# Patient Record
Sex: FEMALE | Race: WHITE | Employment: OTHER | URBAN - METROPOLITAN AREA
[De-identification: names, ages, dates, MRNs, and addresses within clinical notes are randomized per-mention and may not be internally consistent; named-entity substitution may affect disease eponyms.]

---

## 2018-06-07 ENCOUNTER — HOSPITAL ENCOUNTER (OUTPATIENT)
Dept: MAMMOGRAPHY | Age: 73
Discharge: HOME | End: 2018-06-09
Payer: MEDICARE

## 2018-06-07 DIAGNOSIS — Z13.820 SCREENING FOR OSTEOPOROSIS: ICD-10-CM

## 2018-06-07 PROCEDURE — 77080 DXA BONE DENSITY AXIAL: CPT

## 2020-02-19 LAB
AVERAGE GLUCOSE: NORMAL
HBA1C MFR BLD: 5.7 %

## 2020-08-12 LAB
ALBUMIN SERPL-MCNC: NORMAL G/DL
ALP BLD-CCNC: NORMAL U/L
ALT SERPL-CCNC: NORMAL U/L
ANION GAP SERPL CALCULATED.3IONS-SCNC: NORMAL MMOL/L
AST SERPL-CCNC: NORMAL U/L
BASOPHILS ABSOLUTE: NORMAL
BASOPHILS RELATIVE PERCENT: NORMAL
BILIRUB SERPL-MCNC: NORMAL MG/DL
BUN BLDV-MCNC: NORMAL MG/DL
CALCIUM SERPL-MCNC: NORMAL MG/DL
CHLORIDE BLD-SCNC: NORMAL MMOL/L
CHOLESTEROL, TOTAL: NORMAL
CHOLESTEROL/HDL RATIO: NORMAL
CO2: NORMAL
CREAT SERPL-MCNC: NORMAL MG/DL
EOSINOPHILS ABSOLUTE: NORMAL
EOSINOPHILS RELATIVE PERCENT: NORMAL
GFR CALCULATED: NORMAL
GLUCOSE BLD-MCNC: NORMAL MG/DL
HCT VFR BLD CALC: NORMAL %
HDLC SERPL-MCNC: NORMAL MG/DL
HEMOGLOBIN: NORMAL
LDL CHOLESTEROL CALCULATED: NORMAL
LYMPHOCYTES ABSOLUTE: NORMAL
LYMPHOCYTES RELATIVE PERCENT: NORMAL
MCH RBC QN AUTO: NORMAL PG
MCHC RBC AUTO-ENTMCNC: NORMAL G/DL
MCV RBC AUTO: NORMAL FL
MONOCYTES ABSOLUTE: NORMAL
MONOCYTES RELATIVE PERCENT: NORMAL
NEUTROPHILS ABSOLUTE: NORMAL
NEUTROPHILS RELATIVE PERCENT: NORMAL
NONHDLC SERPL-MCNC: NORMAL MG/DL
PDW BLD-RTO: NORMAL %
PLATELET # BLD: NORMAL 10*3/UL
PMV BLD AUTO: NORMAL FL
POTASSIUM SERPL-SCNC: NORMAL MMOL/L
RBC # BLD: NORMAL 10*6/UL
SODIUM BLD-SCNC: NORMAL MMOL/L
TOTAL PROTEIN: NORMAL
TRIGL SERPL-MCNC: NORMAL MG/DL
TSH SERPL DL<=0.05 MIU/L-ACNC: 1.76 UIU/ML
VITAMIN D 25-HYDROXY: NORMAL
VITAMIN D2, 25 HYDROXY: NORMAL
VITAMIN D3,25 HYDROXY: NORMAL
VLDLC SERPL CALC-MCNC: NORMAL MG/DL
WBC # BLD: NORMAL 10*3/UL

## 2020-08-25 LAB
AVERAGE GLUCOSE: NORMAL
HBA1C MFR BLD: 5.7 %

## 2020-11-25 LAB
AVERAGE GLUCOSE: NORMAL
HBA1C MFR BLD: 5.8 %

## 2021-02-26 LAB
AVERAGE GLUCOSE: NORMAL
HBA1C MFR BLD: 5.8 %

## 2021-06-03 LAB
AVERAGE GLUCOSE: NORMAL
HBA1C MFR BLD: 5.8 %

## 2021-09-02 LAB
ALBUMIN SERPL-MCNC: NORMAL G/DL
ALP BLD-CCNC: NORMAL U/L
ALT SERPL-CCNC: NORMAL U/L
ANION GAP SERPL CALCULATED.3IONS-SCNC: NORMAL MMOL/L
AST SERPL-CCNC: NORMAL U/L
BASOPHILS ABSOLUTE: NORMAL
BASOPHILS RELATIVE PERCENT: NORMAL
BILIRUB SERPL-MCNC: NORMAL MG/DL
BUN BLDV-MCNC: NORMAL MG/DL
CALCIUM SERPL-MCNC: NORMAL MG/DL
CHLORIDE BLD-SCNC: NORMAL MMOL/L
CHOLESTEROL, TOTAL: NORMAL
CHOLESTEROL/HDL RATIO: NORMAL
CO2: NORMAL
CREAT SERPL-MCNC: NORMAL MG/DL
EOSINOPHILS ABSOLUTE: NORMAL
EOSINOPHILS RELATIVE PERCENT: NORMAL
GFR CALCULATED: NORMAL
GLUCOSE BLD-MCNC: NORMAL MG/DL
HCT VFR BLD CALC: NORMAL %
HDLC SERPL-MCNC: NORMAL MG/DL
HEMOGLOBIN: NORMAL
LDL CHOLESTEROL CALCULATED: NORMAL
LYMPHOCYTES ABSOLUTE: NORMAL
LYMPHOCYTES RELATIVE PERCENT: NORMAL
MCH RBC QN AUTO: NORMAL PG
MCHC RBC AUTO-ENTMCNC: NORMAL G/DL
MCV RBC AUTO: NORMAL FL
MONOCYTES ABSOLUTE: NORMAL
MONOCYTES RELATIVE PERCENT: NORMAL
NEUTROPHILS ABSOLUTE: NORMAL
NEUTROPHILS RELATIVE PERCENT: NORMAL
NONHDLC SERPL-MCNC: NORMAL MG/DL
PDW BLD-RTO: NORMAL %
PLATELET # BLD: NORMAL 10*3/UL
PMV BLD AUTO: NORMAL FL
POTASSIUM SERPL-SCNC: NORMAL MMOL/L
RBC # BLD: NORMAL 10*6/UL
SODIUM BLD-SCNC: NORMAL MMOL/L
TOTAL PROTEIN: NORMAL
TRIGL SERPL-MCNC: NORMAL MG/DL
VITAMIN D 25-HYDROXY: NORMAL
VITAMIN D2, 25 HYDROXY: NORMAL
VITAMIN D3,25 HYDROXY: NORMAL
VLDLC SERPL CALC-MCNC: NORMAL MG/DL
WBC # BLD: NORMAL 10*3/UL

## 2021-09-07 LAB
AVERAGE GLUCOSE: NORMAL
HBA1C MFR BLD: 5.7 %

## 2021-12-08 LAB
AVERAGE GLUCOSE: NORMAL
HBA1C MFR BLD: 5.8 %

## 2022-05-20 RX ORDER — CALCIUM CITRATE/VITAMIN D3 200MG-6.25
TABLET ORAL
Qty: 50 STRIP | Refills: 10 | Status: SHIPPED | OUTPATIENT
Start: 2022-05-20

## 2022-07-08 RX ORDER — GLUCOSAM/CHON-MSM1/C/MANG/BOSW 500-416.6
TABLET ORAL
Refills: 10 | OUTPATIENT
Start: 2022-07-08

## 2022-07-14 VITALS
DIASTOLIC BLOOD PRESSURE: 60 MMHG | OXYGEN SATURATION: 97 % | BODY MASS INDEX: 36.16 KG/M2 | WEIGHT: 225 LBS | HEIGHT: 66 IN | SYSTOLIC BLOOD PRESSURE: 102 MMHG | TEMPERATURE: 97.8 F | HEART RATE: 80 BPM

## 2022-07-14 RX ORDER — LISINOPRIL AND HYDROCHLOROTHIAZIDE 12.5; 1 MG/1; MG/1
TABLET ORAL
COMMUNITY
Start: 2022-06-17

## 2022-07-14 RX ORDER — OXYBUTYNIN CHLORIDE 5 MG/1
TABLET ORAL
COMMUNITY
Start: 2022-06-17

## 2022-07-14 RX ORDER — ICOSAPENT ETHYL 1000 MG/1
CAPSULE ORAL
COMMUNITY
Start: 2022-07-07

## 2022-07-14 RX ORDER — VERAPAMIL HYDROCHLORIDE 120 MG/1
TABLET, FILM COATED ORAL
COMMUNITY
Start: 2022-06-17

## 2022-07-14 RX ORDER — LOVASTATIN 40 MG/1
TABLET ORAL
COMMUNITY
Start: 2022-06-17

## 2022-07-14 RX ORDER — GLUCOSAM/CHON-MSM1/C/MANG/BOSW 500-416.6
TABLET ORAL
COMMUNITY
Start: 2022-04-06

## 2022-07-14 RX ORDER — LEVOTHYROXINE SODIUM 0.1 MG/1
TABLET ORAL
COMMUNITY
Start: 2022-06-17

## 2022-07-19 ENCOUNTER — HOSPITAL ENCOUNTER (OUTPATIENT)
Dept: GENERAL RADIOLOGY | Age: 77
Discharge: HOME | End: 2022-07-21
Payer: MEDICARE

## 2022-07-19 ENCOUNTER — HOSPITAL ENCOUNTER (OUTPATIENT)
Age: 77
Discharge: HOME | End: 2022-07-21
Payer: MEDICARE

## 2022-07-19 DIAGNOSIS — M79.671 FOOT PAIN, RIGHT: ICD-10-CM

## 2022-07-19 PROCEDURE — 73630 X-RAY EXAM OF FOOT: CPT

## 2023-02-13 PROBLEM — E03.9 HYPOTHYROIDISM: Status: ACTIVE | Noted: 2023-02-13

## 2023-02-13 PROBLEM — F41.9 ANXIETY: Status: ACTIVE | Noted: 2023-02-13

## 2023-02-13 PROBLEM — M77.31 CALCANEAL SPUR OF RIGHT FOOT: Status: ACTIVE | Noted: 2023-02-13

## 2023-02-13 PROBLEM — N18.30 CKD (CHRONIC KIDNEY DISEASE) STAGE 3, GFR 30-59 ML/MIN (MULTI): Status: ACTIVE | Noted: 2023-02-13

## 2023-02-13 PROBLEM — M19.90 OSTEOARTHRITIS: Status: ACTIVE | Noted: 2023-02-13

## 2023-02-13 PROBLEM — M19.079 OA (OSTEOARTHRITIS) OF FOOT: Status: ACTIVE | Noted: 2023-02-13

## 2023-02-13 PROBLEM — E11.65 TYPE 2 DIABETES MELLITUS WITH HYPERGLYCEMIA, WITHOUT LONG-TERM CURRENT USE OF INSULIN (MULTI): Status: ACTIVE | Noted: 2023-02-13

## 2023-02-13 PROBLEM — E78.1 HYPERTRIGLYCERIDEMIA: Status: ACTIVE | Noted: 2023-02-13

## 2023-02-13 PROBLEM — D75.1 ERYTHROCYTOSIS: Status: ACTIVE | Noted: 2023-02-13

## 2023-02-13 PROBLEM — J30.9 ALLERGIC RHINITIS: Status: ACTIVE | Noted: 2023-02-13

## 2023-02-13 PROBLEM — K63.5 COLON POLYP: Status: ACTIVE | Noted: 2023-02-13

## 2023-02-13 PROBLEM — N32.81 OVERACTIVE BLADDER: Status: ACTIVE | Noted: 2023-02-13

## 2023-02-13 PROBLEM — E78.5 HYPERLIPIDEMIA: Status: ACTIVE | Noted: 2023-02-13

## 2023-02-13 PROBLEM — E55.9 VITAMIN D DEFICIENCY: Status: ACTIVE | Noted: 2023-02-13

## 2023-02-13 PROBLEM — I10 HYPERTENSION: Status: ACTIVE | Noted: 2023-02-13

## 2023-03-08 ENCOUNTER — TELEPHONE (OUTPATIENT)
Dept: PRIMARY CARE | Facility: CLINIC | Age: 78
End: 2023-03-08
Payer: MEDICARE

## 2023-03-08 DIAGNOSIS — I10 PRIMARY HYPERTENSION: ICD-10-CM

## 2023-03-08 DIAGNOSIS — E78.1 HYPERTRIGLYCERIDEMIA: ICD-10-CM

## 2023-03-08 DIAGNOSIS — N32.81 OVERACTIVE BLADDER: Primary | ICD-10-CM

## 2023-03-08 DIAGNOSIS — E03.9 ACQUIRED HYPOTHYROIDISM: ICD-10-CM

## 2023-03-08 DIAGNOSIS — E78.2 MIXED HYPERLIPIDEMIA: ICD-10-CM

## 2023-03-08 RX ORDER — LISINOPRIL AND HYDROCHLOROTHIAZIDE 10; 12.5 MG/1; MG/1
1 TABLET ORAL DAILY
Qty: 30 TABLET | Refills: 1 | Status: SHIPPED | OUTPATIENT
Start: 2023-03-08 | End: 2023-04-06

## 2023-03-08 RX ORDER — OXYBUTYNIN CHLORIDE 5 MG/1
5 TABLET ORAL DAILY
Qty: 30 TABLET | Refills: 1 | Status: SHIPPED | OUTPATIENT
Start: 2023-03-08 | End: 2023-04-06

## 2023-03-08 RX ORDER — VERAPAMIL HYDROCHLORIDE 120 MG/1
120 TABLET, FILM COATED ORAL 2 TIMES DAILY
Qty: 60 TABLET | Refills: 1 | Status: SHIPPED | OUTPATIENT
Start: 2023-03-08 | End: 2023-04-06

## 2023-03-08 RX ORDER — ICOSAPENT ETHYL 1 G/1
2 CAPSULE ORAL
Qty: 120 CAPSULE | Refills: 1 | Status: SHIPPED | OUTPATIENT
Start: 2023-03-08 | End: 2023-04-18 | Stop reason: SDUPTHER

## 2023-03-08 RX ORDER — LEVOTHYROXINE SODIUM 100 UG/1
100 TABLET ORAL DAILY
Qty: 30 TABLET | Refills: 1 | Status: SHIPPED | OUTPATIENT
Start: 2023-03-08 | End: 2023-04-06

## 2023-03-08 RX ORDER — LOVASTATIN 40 MG/1
80 TABLET ORAL DAILY
Qty: 60 TABLET | Refills: 1 | Status: SHIPPED | OUTPATIENT
Start: 2023-03-08 | End: 2023-04-06

## 2023-03-08 NOTE — TELEPHONE ENCOUNTER
Patient needs refills for oxybutynin 5 mg, levothyroxine 100 mcg, lovastatin 40 mg, verapamil 120 mg, vascepa 1 gm, lisinopril hydrochlorothiazide 10-12.5 mg. Will go to [facility]

## 2023-04-06 DIAGNOSIS — E78.2 MIXED HYPERLIPIDEMIA: ICD-10-CM

## 2023-04-06 DIAGNOSIS — N32.81 OVERACTIVE BLADDER: ICD-10-CM

## 2023-04-06 DIAGNOSIS — I10 PRIMARY HYPERTENSION: ICD-10-CM

## 2023-04-06 DIAGNOSIS — E03.9 ACQUIRED HYPOTHYROIDISM: ICD-10-CM

## 2023-04-06 DIAGNOSIS — E78.1 HYPERTRIGLYCERIDEMIA: ICD-10-CM

## 2023-04-06 RX ORDER — OXYBUTYNIN CHLORIDE 5 MG/1
5 TABLET ORAL DAILY
Qty: 90 TABLET | Refills: 1 | Status: SHIPPED | OUTPATIENT
Start: 2023-04-06 | End: 2023-04-18 | Stop reason: SDUPTHER

## 2023-04-06 RX ORDER — VERAPAMIL HYDROCHLORIDE 120 MG/1
120 TABLET, FILM COATED ORAL 2 TIMES DAILY
Qty: 180 TABLET | Refills: 1 | Status: SHIPPED | OUTPATIENT
Start: 2023-04-06 | End: 2023-04-18 | Stop reason: SDUPTHER

## 2023-04-06 RX ORDER — LISINOPRIL AND HYDROCHLOROTHIAZIDE 10; 12.5 MG/1; MG/1
1 TABLET ORAL DAILY
Qty: 90 TABLET | Refills: 1 | Status: SHIPPED | OUTPATIENT
Start: 2023-04-06 | End: 2023-04-18 | Stop reason: SDUPTHER

## 2023-04-06 RX ORDER — LEVOTHYROXINE SODIUM 100 UG/1
100 TABLET ORAL DAILY
Qty: 90 TABLET | Refills: 1 | Status: SHIPPED | OUTPATIENT
Start: 2023-04-06 | End: 2023-04-18 | Stop reason: SDUPTHER

## 2023-04-06 RX ORDER — LOVASTATIN 40 MG/1
80 TABLET ORAL DAILY
Qty: 180 TABLET | Refills: 1 | Status: SHIPPED | OUTPATIENT
Start: 2023-04-06 | End: 2023-04-18 | Stop reason: SDUPTHER

## 2023-04-18 ENCOUNTER — OFFICE VISIT (OUTPATIENT)
Dept: PRIMARY CARE | Facility: CLINIC | Age: 78
End: 2023-04-18
Payer: MEDICARE

## 2023-04-18 VITALS
BODY MASS INDEX: 34.07 KG/M2 | SYSTOLIC BLOOD PRESSURE: 110 MMHG | RESPIRATION RATE: 16 BRPM | HEIGHT: 66 IN | DIASTOLIC BLOOD PRESSURE: 58 MMHG | OXYGEN SATURATION: 99 % | HEART RATE: 68 BPM | WEIGHT: 212 LBS | TEMPERATURE: 99 F

## 2023-04-18 DIAGNOSIS — E11.65 TYPE 2 DIABETES MELLITUS WITH HYPERGLYCEMIA, WITHOUT LONG-TERM CURRENT USE OF INSULIN (MULTI): ICD-10-CM

## 2023-04-18 DIAGNOSIS — N32.81 OVERACTIVE BLADDER: ICD-10-CM

## 2023-04-18 DIAGNOSIS — Z53.20 OSTEOPOROSIS SCREENING DECLINED: ICD-10-CM

## 2023-04-18 DIAGNOSIS — E78.2 MIXED HYPERLIPIDEMIA: ICD-10-CM

## 2023-04-18 DIAGNOSIS — Z00.00 MEDICARE ANNUAL WELLNESS VISIT, SUBSEQUENT: Primary | ICD-10-CM

## 2023-04-18 DIAGNOSIS — N18.31 STAGE 3A CHRONIC KIDNEY DISEASE (MULTI): ICD-10-CM

## 2023-04-18 DIAGNOSIS — Z12.11 COLON CANCER SCREENING: ICD-10-CM

## 2023-04-18 DIAGNOSIS — I10 ESSENTIAL HYPERTENSION: ICD-10-CM

## 2023-04-18 DIAGNOSIS — Z23 NEED FOR VACCINATION: ICD-10-CM

## 2023-04-18 DIAGNOSIS — E55.9 VITAMIN D DEFICIENCY: ICD-10-CM

## 2023-04-18 DIAGNOSIS — E03.9 ACQUIRED HYPOTHYROIDISM: ICD-10-CM

## 2023-04-18 DIAGNOSIS — E78.1 HYPERTRIGLYCERIDEMIA: ICD-10-CM

## 2023-04-18 PROBLEM — D75.1 ERYTHROCYTOSIS: Status: RESOLVED | Noted: 2023-02-13 | Resolved: 2023-04-18

## 2023-04-18 PROCEDURE — G0439 PPPS, SUBSEQ VISIT: HCPCS | Performed by: FAMILY MEDICINE

## 2023-04-18 PROCEDURE — 3078F DIAST BP <80 MM HG: CPT | Performed by: FAMILY MEDICINE

## 2023-04-18 PROCEDURE — 99214 OFFICE O/P EST MOD 30 MIN: CPT | Performed by: FAMILY MEDICINE

## 2023-04-18 PROCEDURE — 1036F TOBACCO NON-USER: CPT | Performed by: FAMILY MEDICINE

## 2023-04-18 PROCEDURE — 1160F RVW MEDS BY RX/DR IN RCRD: CPT | Performed by: FAMILY MEDICINE

## 2023-04-18 PROCEDURE — 3074F SYST BP LT 130 MM HG: CPT | Performed by: FAMILY MEDICINE

## 2023-04-18 PROCEDURE — 1159F MED LIST DOCD IN RCRD: CPT | Performed by: FAMILY MEDICINE

## 2023-04-18 PROCEDURE — 1170F FXNL STATUS ASSESSED: CPT | Performed by: FAMILY MEDICINE

## 2023-04-18 RX ORDER — VERAPAMIL HYDROCHLORIDE 120 MG/1
120 TABLET, FILM COATED ORAL 2 TIMES DAILY
Qty: 180 TABLET | Refills: 1 | Status: SHIPPED | OUTPATIENT
Start: 2023-04-18 | End: 2023-10-18 | Stop reason: SDUPTHER

## 2023-04-18 RX ORDER — CALCIUM CITRATE/VITAMIN D3 200MG-6.25
TABLET ORAL
Qty: 100 STRIP | Refills: 3 | Status: SHIPPED | OUTPATIENT
Start: 2023-04-18 | End: 2024-04-18 | Stop reason: SDUPTHER

## 2023-04-18 RX ORDER — ICOSAPENT ETHYL 1 G/1
2 CAPSULE ORAL
Qty: 360 CAPSULE | Refills: 1 | Status: SHIPPED | OUTPATIENT
Start: 2023-04-18 | End: 2023-10-18 | Stop reason: SDUPTHER

## 2023-04-18 RX ORDER — LOVASTATIN 40 MG/1
80 TABLET ORAL DAILY
Qty: 180 TABLET | Refills: 1 | Status: SHIPPED | OUTPATIENT
Start: 2023-04-18 | End: 2023-10-18 | Stop reason: SDUPTHER

## 2023-04-18 RX ORDER — LISINOPRIL AND HYDROCHLOROTHIAZIDE 10; 12.5 MG/1; MG/1
1 TABLET ORAL DAILY
Qty: 90 TABLET | Refills: 1 | Status: SHIPPED | OUTPATIENT
Start: 2023-04-18 | End: 2023-10-18 | Stop reason: SDUPTHER

## 2023-04-18 RX ORDER — LEVOTHYROXINE SODIUM 100 UG/1
100 TABLET ORAL DAILY
Qty: 90 TABLET | Refills: 1 | Status: SHIPPED | OUTPATIENT
Start: 2023-04-18 | End: 2023-10-18 | Stop reason: SDUPTHER

## 2023-04-18 RX ORDER — OXYBUTYNIN CHLORIDE 5 MG/1
5 TABLET ORAL DAILY
Qty: 90 TABLET | Refills: 1 | Status: SHIPPED | OUTPATIENT
Start: 2023-04-18 | End: 2023-10-18 | Stop reason: DRUGHIGH

## 2023-04-18 RX ORDER — LANCETS 33 GAUGE
EACH MISCELLANEOUS
COMMUNITY
Start: 2023-01-16 | End: 2023-08-21 | Stop reason: SDUPTHER

## 2023-04-18 ASSESSMENT — ENCOUNTER SYMPTOMS
CHILLS: 0
SINUS PRESSURE: 0
NERVOUS/ANXIOUS: 0
POLYDIPSIA: 0
DIAPHORESIS: 0
CONSTIPATION: 0
DYSPHORIC MOOD: 0
HEADACHES: 0
ABDOMINAL PAIN: 0
POLYPHAGIA: 0
SHORTNESS OF BREATH: 0
DIARRHEA: 0
DYSURIA: 0
COUGH: 0
NUMBNESS: 0
HEMATURIA: 0
NAUSEA: 0
LIGHT-HEADEDNESS: 0
CHEST TIGHTNESS: 0
FREQUENCY: 0
ADENOPATHY: 0
FEVER: 0
WHEEZING: 0
UNEXPECTED WEIGHT CHANGE: 0
SORE THROAT: 0
CONFUSION: 0
PALPITATIONS: 0
VOMITING: 0
SINUS PAIN: 0
DIZZINESS: 0

## 2023-04-18 ASSESSMENT — ACTIVITIES OF DAILY LIVING (ADL)
DRESSING: INDEPENDENT
DOING_HOUSEWORK: INDEPENDENT
TAKING_MEDICATION: INDEPENDENT
MANAGING_FINANCES: INDEPENDENT
BATHING: INDEPENDENT
GROCERY_SHOPPING: INDEPENDENT

## 2023-04-18 ASSESSMENT — PATIENT HEALTH QUESTIONNAIRE - PHQ9
2. FEELING DOWN, DEPRESSED OR HOPELESS: NOT AT ALL
1. LITTLE INTEREST OR PLEASURE IN DOING THINGS: NOT AT ALL
SUM OF ALL RESPONSES TO PHQ9 QUESTIONS 1 AND 2: 0

## 2023-04-18 NOTE — PROGRESS NOTES
"Subjective   Reason for Visit: Nataliia Moralez is an 77 y.o. female here for a Medicare Wellness visit.     Past Medical, Surgical, and Family History reviewed and updated in chart.    Reviewed all medications by prescribing practitioner or clinical pharmacist (such as prescriptions, OTCs, herbal therapies and supplements) and documented in the medical record.    routine follow up. chronic issues as per assessment and plan.         Patient Care Team:  Sania Forrester MD as PCP - General  Sania Forrester MD as PCP - [employer] PCP     Review of Systems   Constitutional:  Negative for chills, diaphoresis, fever and unexpected weight change.   HENT:  Negative for congestion, sinus pressure, sinus pain, sneezing and sore throat.    Respiratory:  Negative for cough, chest tightness, shortness of breath and wheezing.    Cardiovascular:  Negative for chest pain, palpitations and leg swelling.   Gastrointestinal:  Negative for abdominal pain, constipation, diarrhea, nausea and vomiting.   Endocrine: Negative for cold intolerance, heat intolerance, polydipsia, polyphagia and polyuria.   Genitourinary:  Negative for dysuria, frequency, hematuria and urgency.   Neurological:  Negative for dizziness, syncope, light-headedness, numbness and headaches.   Hematological:  Negative for adenopathy.   Psychiatric/Behavioral:  Negative for confusion and dysphoric mood. The patient is not nervous/anxious.        Objective   Vitals:  /58 (BP Location: Right arm, Patient Position: Sitting, BP Cuff Size: Adult)   Pulse 68   Temp 37.2 °C (99 °F)   Resp 16   Ht 1.676 m (5' 6\")   Wt 96.2 kg (212 lb)   SpO2 99%   BMI 34.22 kg/m²       Physical Exam  Vitals and nursing note reviewed.   Constitutional:       General: She is not in acute distress.     Appearance: Normal appearance.   HENT:      Head: Normocephalic and atraumatic.      Nose: Nose normal.   Eyes:      Extraocular Movements: Extraocular movements intact.      " Conjunctiva/sclera: Conjunctivae normal.      Pupils: Pupils are equal, round, and reactive to light.   Cardiovascular:      Rate and Rhythm: Normal rate and regular rhythm.      Heart sounds: No murmur heard.     No friction rub. No gallop.   Pulmonary:      Effort: Pulmonary effort is normal.      Breath sounds: Normal breath sounds. No wheezing, rhonchi or rales.   Abdominal:      General: Bowel sounds are normal. There is no distension.      Palpations: Abdomen is soft.      Tenderness: There is no abdominal tenderness.   Musculoskeletal:         General: Normal range of motion.      Cervical back: Normal range of motion and neck supple.   Skin:     General: Skin is warm and dry.   Neurological:      General: No focal deficit present.      Mental Status: She is alert and oriented to person, place, and time.      Deep Tendon Reflexes: Reflexes normal.   Psychiatric:         Mood and Affect: Mood normal.         Behavior: Behavior normal.         Thought Content: Thought content normal.         Judgment: Judgment normal.                        Assessment/Plan   Problem List Items Addressed This Visit       Acquired hypothyroidism    Current Assessment & Plan     - controlled. Continue levothyroxine  - recheck TFTs Sept. 2023         Relevant Medications    levothyroxine (Synthroid, Levoxyl) 100 mcg tablet    Other Relevant Orders    Vitamin B12    CBC and Auto Differential    Comprehensive Metabolic Panel    TSH with reflex to Free T4 if abnormal    CKD (chronic kidney disease) stage 3, GFR 30-59 ml/min (CMS/HCC)    Current Assessment & Plan     - stable with Cr 0.92 and GFR 65  - monitor at least yearly (next due Sept. 2023)  - avoid NSAIDs         Relevant Orders    Vitamin B12    CBC and Auto Differential    Comprehensive Metabolic Panel    Colon cancer screening    Current Assessment & Plan     colonoscopy 10/22/21 with Dr. Hinds --> 3 tubular adenoma and 1 hyperplastic polyp         Essential hypertension     Current Assessment & Plan     controlled. continue lisinopril/ hydrochlorothiazide and verapamil         Relevant Medications    lisinopriL-hydrochlorothiazide 10-12.5 mg tablet    verapamil (Calan) 120 mg tablet    Other Relevant Orders    Vitamin B12    CBC and Auto Differential    Comprehensive Metabolic Panel    Hypertriglyceridemia    Current Assessment & Plan      - controlled. continue lovastatin and vascepa   - recheck lipids Sept. 2023         Relevant Medications    lovastatin (Mevacor) 40 mg tablet    icosapent ethyL (Vascepa) 1 gram capsule    Other Relevant Orders    Vitamin B12    CBC and Auto Differential    Comprehensive Metabolic Panel    Lipid Panel    Mixed hyperlipidemia    Current Assessment & Plan      - controlled. continue lovastatin and vascepa   - recheck lipids Sept. 2023         Relevant Medications    lovastatin (Mevacor) 40 mg tablet    Other Relevant Orders    Vitamin B12    CBC and Auto Differential    Comprehensive Metabolic Panel    Lipid Panel    Overactive bladder    Relevant Medications    oxybutynin (Ditropan) 5 mg tablet    Other Relevant Orders    Vitamin B12    CBC and Auto Differential    Comprehensive Metabolic Panel    Type 2 diabetes mellitus with hyperglycemia, without long-term current use of insulin (CMS/[facility])    Current Assessment & Plan     - HbA1c 5.8 at last check. States she had blood work recently. Will requ  - diet controlled   - Encouraged healthy lifestyle, including adequate exercise and high fiber, low fat and low carb diet.  - recheck HbA1c around Sept 2023 and recheck microalbumin around Sept. 2023         Relevant Medications    blood sugar diagnostic (True Metrix Glucose Test Strip) strip    Other Relevant Orders    Vitamin B12    Hemoglobin A1C    Albumin , Urine Random    CBC and Auto Differential    Comprehensive Metabolic Panel    Vitamin D deficiency    Relevant Orders    Vitamin D, Total    Vitamin B12    CBC and Auto Differential    Comprehensive  Metabolic Panel     Other Visit Diagnoses       Medicare annual wellness visit, subsequent    -  Primary    Relevant Orders    Vitamin B12    CBC and Auto Differential    Comprehensive Metabolic Panel    Osteoporosis screening declined        Need for vaccination        Relevant Medications    diphth,pertus,acell,,tetanus (BoostRIX) 2.5-8-5 Lf-mcg-Lf/0.5mL injection    zoster vaccine-recombinant adjuvanted (Shingrix) 50 mcg/0.5 mL vaccine

## 2023-04-18 NOTE — ASSESSMENT & PLAN NOTE
- HbA1c 5.8 at last check. States she had blood work recently. Will requ  - diet controlled   - Encouraged healthy lifestyle, including adequate exercise and high fiber, low fat and low carb diet.  - recheck HbA1c around Sept 2023 and recheck microalbumin around Sept. 2023

## 2023-04-18 NOTE — PATIENT INSTRUCTIONS
Nataliia Moralez ,    Thank you for coming in today. We at [facility] appreciate your trust in our care. If you have any questions or concerns about the care you received today, please do not hesitate to contact us at [phone].    The following instructions were discussed today:    - I recommend getting Shingrix (shingles) vaccine at your pharmacy.    - I recommend you get Tdap (tetanus) vaccine at your pharmacy.    - Follow up in 6 months   - Please get blood work done 1-2 weeks prior to your next visit.

## 2023-04-26 ENCOUNTER — TELEPHONE (OUTPATIENT)
Dept: PRIMARY CARE | Facility: CLINIC | Age: 78
End: 2023-04-26
Payer: MEDICARE

## 2023-04-26 LAB
ESTIMATED AVERAGE GLUCOSE FOR HBA1C EXTERNAL: 120 MG/DL (ref ?–117)
HEMOGLOBIN A1C/HEMOGLOBIN TOTAL IN BLOOD EXTERNAL: 5.8 % (ref 4.2–5.6)

## 2023-08-21 DIAGNOSIS — E55.9 VITAMIN D DEFICIENCY: ICD-10-CM

## 2023-08-21 DIAGNOSIS — E11.65 TYPE 2 DIABETES MELLITUS WITH HYPERGLYCEMIA, WITHOUT LONG-TERM CURRENT USE OF INSULIN (MULTI): Primary | ICD-10-CM

## 2023-08-21 RX ORDER — LANCETS 33 GAUGE
EACH MISCELLANEOUS
Qty: 90 EACH | Refills: 3 | Status: SHIPPED | OUTPATIENT
Start: 2023-08-21

## 2023-08-21 RX ORDER — ACETAMINOPHEN 500 MG
50 TABLET ORAL DAILY
Qty: 90 CAPSULE | Refills: 3 | Status: SHIPPED | OUTPATIENT
Start: 2023-08-21 | End: 2024-08-20

## 2023-10-05 ENCOUNTER — DOCUMENTATION (OUTPATIENT)
Dept: PRIMARY CARE | Facility: CLINIC | Age: 78
End: 2023-10-05
Payer: MEDICARE

## 2023-10-05 LAB
ALBUMIN/CREATININE (UG/MG) IN URINE EXTERNAL: 1.2 UG/MG CREAT
CHOLESTEROL (MG/DL) IN SER/PLAS EXTERNAL: 126 MG/DL
CHOLESTEROL IN HDL (MG/DL) IN SER/PLAS EXTERNAL: 31 MG/DL (ref 39–?)
CHOLESTEROL IN LDL (MG/DL) IN SERUM OR PLASMA BY CALCULATION EXTERNAL: 60 MG/DL
CHOLESTEROL/HDL RATIO EXTERNAL: 4.1
HEMOGLOBIN A1C/HEMOGLOBIN TOTAL IN BLOOD EXTERNAL: 6 % (ref 4.2–5.6)
THYROTROPIN (MIU/L) IN SER/PLAS BY DETECTION LIMIT <= 0.05 MIU/L EXTERNAL: 0.76 MIU/L
TRIGLYCERIDE (MG/DL) IN SER/PLAS EXTERNAL: 175 MG/DL (ref ?–150)
VLDL EXTERNAL: 35 MG/DL (ref ?–30)

## 2023-10-18 ENCOUNTER — OFFICE VISIT (OUTPATIENT)
Dept: PRIMARY CARE | Facility: CLINIC | Age: 78
End: 2023-10-18
Payer: MEDICARE

## 2023-10-18 VITALS
HEART RATE: 62 BPM | DIASTOLIC BLOOD PRESSURE: 60 MMHG | HEIGHT: 66 IN | RESPIRATION RATE: 16 BRPM | WEIGHT: 212 LBS | OXYGEN SATURATION: 95 % | SYSTOLIC BLOOD PRESSURE: 110 MMHG | TEMPERATURE: 98 F | BODY MASS INDEX: 34.07 KG/M2

## 2023-10-18 DIAGNOSIS — E55.9 VITAMIN D DEFICIENCY: ICD-10-CM

## 2023-10-18 DIAGNOSIS — Z23 ENCOUNTER FOR IMMUNIZATION: Primary | ICD-10-CM

## 2023-10-18 DIAGNOSIS — E66.09 CLASS 1 OBESITY DUE TO EXCESS CALORIES WITH SERIOUS COMORBIDITY AND BODY MASS INDEX (BMI) OF 34.0 TO 34.9 IN ADULT: ICD-10-CM

## 2023-10-18 DIAGNOSIS — E78.1 HYPERTRIGLYCERIDEMIA: ICD-10-CM

## 2023-10-18 DIAGNOSIS — E03.9 ACQUIRED HYPOTHYROIDISM: ICD-10-CM

## 2023-10-18 DIAGNOSIS — E78.2 MIXED HYPERLIPIDEMIA: ICD-10-CM

## 2023-10-18 DIAGNOSIS — N18.31 STAGE 3A CHRONIC KIDNEY DISEASE (MULTI): ICD-10-CM

## 2023-10-18 DIAGNOSIS — E11.65 TYPE 2 DIABETES MELLITUS WITH HYPERGLYCEMIA, WITHOUT LONG-TERM CURRENT USE OF INSULIN (MULTI): ICD-10-CM

## 2023-10-18 DIAGNOSIS — N32.81 OVERACTIVE BLADDER: ICD-10-CM

## 2023-10-18 DIAGNOSIS — I10 ESSENTIAL HYPERTENSION: ICD-10-CM

## 2023-10-18 PROBLEM — F41.9 ANXIETY: Status: RESOLVED | Noted: 2023-02-13 | Resolved: 2023-10-18

## 2023-10-18 PROBLEM — E66.811 CLASS 1 OBESITY DUE TO EXCESS CALORIES WITH SERIOUS COMORBIDITY AND BODY MASS INDEX (BMI) OF 34.0 TO 34.9 IN ADULT: Status: ACTIVE | Noted: 2023-10-18

## 2023-10-18 PROBLEM — M77.31 CALCANEAL SPUR OF RIGHT FOOT: Status: RESOLVED | Noted: 2023-02-13 | Resolved: 2023-10-18

## 2023-10-18 PROCEDURE — 99214 OFFICE O/P EST MOD 30 MIN: CPT | Performed by: FAMILY MEDICINE

## 2023-10-18 PROCEDURE — 3078F DIAST BP <80 MM HG: CPT | Performed by: FAMILY MEDICINE

## 2023-10-18 PROCEDURE — 3074F SYST BP LT 130 MM HG: CPT | Performed by: FAMILY MEDICINE

## 2023-10-18 PROCEDURE — 1160F RVW MEDS BY RX/DR IN RCRD: CPT | Performed by: FAMILY MEDICINE

## 2023-10-18 PROCEDURE — 1036F TOBACCO NON-USER: CPT | Performed by: FAMILY MEDICINE

## 2023-10-18 PROCEDURE — 1159F MED LIST DOCD IN RCRD: CPT | Performed by: FAMILY MEDICINE

## 2023-10-18 RX ORDER — LEVOTHYROXINE SODIUM 100 UG/1
100 TABLET ORAL DAILY
Qty: 90 TABLET | Refills: 1 | Status: SHIPPED | OUTPATIENT
Start: 2023-10-18 | End: 2024-04-18 | Stop reason: SDUPTHER

## 2023-10-18 RX ORDER — OXYBUTYNIN CHLORIDE 10 MG/1
10 TABLET, EXTENDED RELEASE ORAL DAILY
Qty: 90 TABLET | Refills: 1 | Status: SHIPPED | OUTPATIENT
Start: 2023-10-18 | End: 2024-04-18

## 2023-10-18 RX ORDER — ICOSAPENT ETHYL 1 G/1
2 CAPSULE ORAL
Qty: 360 CAPSULE | Refills: 1 | Status: SHIPPED | OUTPATIENT
Start: 2023-10-18 | End: 2024-04-18 | Stop reason: SDUPTHER

## 2023-10-18 RX ORDER — LISINOPRIL AND HYDROCHLOROTHIAZIDE 10; 12.5 MG/1; MG/1
1 TABLET ORAL DAILY
Qty: 90 TABLET | Refills: 1 | Status: SHIPPED | OUTPATIENT
Start: 2023-10-18 | End: 2024-04-18 | Stop reason: SDUPTHER

## 2023-10-18 RX ORDER — LOVASTATIN 40 MG/1
80 TABLET ORAL DAILY
Qty: 180 TABLET | Refills: 1 | Status: SHIPPED | OUTPATIENT
Start: 2023-10-18 | End: 2024-04-18 | Stop reason: SDUPTHER

## 2023-10-18 RX ORDER — VERAPAMIL HYDROCHLORIDE 120 MG/1
120 TABLET, FILM COATED ORAL 2 TIMES DAILY
Qty: 180 TABLET | Refills: 1 | Status: SHIPPED | OUTPATIENT
Start: 2023-10-18 | End: 2024-04-18 | Stop reason: SDUPTHER

## 2023-10-18 ASSESSMENT — ENCOUNTER SYMPTOMS
CHILLS: 0
SINUS PRESSURE: 0
DIARRHEA: 0
POLYDIPSIA: 0
ADENOPATHY: 0
VOMITING: 0
ABDOMINAL PAIN: 0
LIGHT-HEADEDNESS: 0
CONFUSION: 0
PALPITATIONS: 0
FEVER: 0
DYSURIA: 0
FREQUENCY: 0
DIAPHORESIS: 0
SHORTNESS OF BREATH: 0
POLYPHAGIA: 0
DYSPHORIC MOOD: 0
UNEXPECTED WEIGHT CHANGE: 0
WHEEZING: 0
NERVOUS/ANXIOUS: 0
NAUSEA: 0
HEADACHES: 0
NUMBNESS: 0
CHEST TIGHTNESS: 0
COUGH: 0
HEMATURIA: 0
CONSTIPATION: 0
SINUS PAIN: 0
SORE THROAT: 0
DIZZINESS: 0

## 2023-10-18 NOTE — PROGRESS NOTES
"Subjective   Patient ID: Nataliia Moralez is a 78 y.o. female who presents for lab results, refills needed, refusing flu vaccine (Will receive flu somewhere else, has questions about medication, oxybutynin and vascepa (been 2 yrs on Vascepa)).    routine follow up. chronic issues as per assessment and plan. Labs from 10/4/23 reviewed with patient and recorded below.                 Media Information          Document Information    Lab: Lab Results      10/04/2023 00:00   Attached To:   LIPID PANEL [[id number]]   Documentation on 10/5/23 with Sania Forrester MD   Source Information    Natalya Heredia MA  Do [id number]        Review of Systems   Constitutional:  Negative for chills, diaphoresis, fever and unexpected weight change.   HENT:  Negative for congestion, sinus pressure, sinus pain, sneezing and sore throat.    Respiratory:  Negative for cough, chest tightness, shortness of breath and wheezing.    Cardiovascular:  Negative for chest pain, palpitations and leg swelling.   Gastrointestinal:  Negative for abdominal pain, constipation, diarrhea, nausea and vomiting.   Endocrine: Negative for cold intolerance, heat intolerance, polydipsia, polyphagia and polyuria.   Genitourinary:  Negative for dysuria, frequency, hematuria and urgency.   Neurological:  Negative for dizziness, syncope, light-headedness, numbness and headaches.   Hematological:  Negative for adenopathy.   Psychiatric/Behavioral:  Negative for confusion and dysphoric mood. The patient is not nervous/anxious.        Objective   /60 (BP Location: Right arm, Patient Position: Sitting, BP Cuff Size: Large adult)   Pulse 62   Temp 36.7 °C (98 °F)   Resp 16   Ht 1.676 m (5' 6\")   Wt 96.2 kg (212 lb)   SpO2 95%   BMI 34.22 kg/m²     Physical Exam  Vitals and nursing note reviewed.   Constitutional:       General: She is not in acute distress.     Appearance: Normal appearance.   HENT:      Head: Normocephalic and atraumatic.      Nose: " Nose normal.   Eyes:      Extraocular Movements: Extraocular movements intact.      Conjunctiva/sclera: Conjunctivae normal.      Pupils: Pupils are equal, round, and reactive to light.   Cardiovascular:      Rate and Rhythm: Normal rate and regular rhythm.      Heart sounds: No murmur heard.     No friction rub. No gallop.   Pulmonary:      Effort: Pulmonary effort is normal.      Breath sounds: Normal breath sounds. No wheezing, rhonchi or rales.   Abdominal:      General: Bowel sounds are normal. There is no distension.      Palpations: Abdomen is soft.      Tenderness: There is no abdominal tenderness.   Musculoskeletal:         General: Normal range of motion.      Cervical back: Normal range of motion and neck supple.   Skin:     General: Skin is warm and dry.   Neurological:      General: No focal deficit present.      Mental Status: She is alert and oriented to person, place, and time.      Deep Tendon Reflexes: Reflexes normal.   Psychiatric:         Mood and Affect: Mood normal.         Behavior: Behavior normal.         Thought Content: Thought content normal.         Judgment: Judgment normal.         Assessment/Plan   Problem List Items Addressed This Visit             ICD-10-CM    Acquired hypothyroidism E03.9     - controlled. Continue levothyroxine  - recheck TFTs Oct 2024         Relevant Medications    levothyroxine (Synthroid, Levoxyl) 100 mcg tablet    BMI 34.0-34.9,adult Z68.34     - Encouraged healthy lifestyle, including adequate exercise and high fiber, low fat and low carb diet.          Class 1 obesity due to excess calories with serious comorbidity and body mass index (BMI) of 34.0 to 34.9 in adult E66.09, Z68.34     - Encouraged healthy lifestyle, including adequate exercise and high fiber, low fat and low carb diet.          Encounter for immunization - Primary Z23     - Declined flu vaccine.  Plans to get at pharmacy          Essential hypertension I10     controlled. continue  lisinopril/ hydrochlorothiazide and verapamil         Relevant Medications    verapamil (Calan) 120 mg tablet    lisinopriL-hydrochlorothiazide 10-12.5 mg tablet    Hypertriglyceridemia E78.1      - moderately well controlled with . continue lovastatin and vascepa   - recheck lipids Oct. 2024         Relevant Medications    lovastatin (Mevacor) 40 mg tablet    icosapent ethyL (Vascepa) 1 gram capsule    Mixed hyperlipidemia E78.2      - controlled. continue lovastatin and vascepa   - recheck lipids Sept. 2023         Relevant Medications    lovastatin (Mevacor) 40 mg tablet    Overactive bladder N32.81     - improved on ditropan but will with some urgency   - will increased to 10 mg daily of XL version          Relevant Medications    oxybutynin XL (Ditropan XL) 10 mg 24 hr tablet    Stage 3a chronic kidney disease (CMS/HCC) N18.31     - stable with Cr 1.02 and GFR 57  - monitor at least yearly (next due Oct. 2024)  - avoid NSAIDs         Type 2 diabetes mellitus with hyperglycemia, without long-term current use of insulin (CMS/HCC) E11.65     - HbA1c 6. Recheck in 6 months   - diet controlled          Vitamin D deficiency E55.9     - continue vitamin D

## 2023-10-18 NOTE — ASSESSMENT & PLAN NOTE
- improved on ditropan but will with some urgency   - will increased to 10 mg daily of XL version

## 2023-10-18 NOTE — PATIENT INSTRUCTIONS
Nataliia Moralez ,    Thank you for coming in today. We at [facility] appreciate your trust in our care. If you have any questions or concerns about the care you received today, please do not hesitate to contact us at [phone].    The following instructions were discussed today:    - INCREASE oxybutynin to 10 mg daily   - Follow up in 6 months

## 2024-04-18 ENCOUNTER — OFFICE VISIT (OUTPATIENT)
Dept: PRIMARY CARE | Facility: CLINIC | Age: 79
End: 2024-04-18
Payer: MEDICARE

## 2024-04-18 VITALS
WEIGHT: 218 LBS | HEART RATE: 69 BPM | HEIGHT: 66 IN | RESPIRATION RATE: 16 BRPM | BODY MASS INDEX: 35.03 KG/M2 | DIASTOLIC BLOOD PRESSURE: 60 MMHG | OXYGEN SATURATION: 98 % | SYSTOLIC BLOOD PRESSURE: 110 MMHG

## 2024-04-18 DIAGNOSIS — Z00.00 MEDICARE ANNUAL WELLNESS VISIT, SUBSEQUENT: Primary | ICD-10-CM

## 2024-04-18 DIAGNOSIS — L30.9 ECZEMA, UNSPECIFIED TYPE: ICD-10-CM

## 2024-04-18 DIAGNOSIS — Z78.0 POSTMENOPAUSAL: ICD-10-CM

## 2024-04-18 DIAGNOSIS — E03.9 ACQUIRED HYPOTHYROIDISM: ICD-10-CM

## 2024-04-18 DIAGNOSIS — E78.1 HYPERTRIGLYCERIDEMIA: ICD-10-CM

## 2024-04-18 DIAGNOSIS — E55.9 VITAMIN D DEFICIENCY: ICD-10-CM

## 2024-04-18 DIAGNOSIS — I10 ESSENTIAL HYPERTENSION: ICD-10-CM

## 2024-04-18 DIAGNOSIS — N18.31 STAGE 3A CHRONIC KIDNEY DISEASE (MULTI): ICD-10-CM

## 2024-04-18 DIAGNOSIS — E66.01 CLASS 2 SEVERE OBESITY DUE TO EXCESS CALORIES WITH SERIOUS COMORBIDITY AND BODY MASS INDEX (BMI) OF 35.0 TO 35.9 IN ADULT (MULTI): ICD-10-CM

## 2024-04-18 DIAGNOSIS — E78.2 MIXED HYPERLIPIDEMIA: ICD-10-CM

## 2024-04-18 DIAGNOSIS — N32.81 OVERACTIVE BLADDER: ICD-10-CM

## 2024-04-18 DIAGNOSIS — Z11.59 NEED FOR HEPATITIS C SCREENING TEST: ICD-10-CM

## 2024-04-18 DIAGNOSIS — E11.65 TYPE 2 DIABETES MELLITUS WITH HYPERGLYCEMIA, WITHOUT LONG-TERM CURRENT USE OF INSULIN (MULTI): ICD-10-CM

## 2024-04-18 PROBLEM — E66.9 OBESITY, UNSPECIFIED: Status: ACTIVE | Noted: 2023-10-18

## 2024-04-18 PROBLEM — E66.812 CLASS 2 SEVERE OBESITY DUE TO EXCESS CALORIES WITH SERIOUS COMORBIDITY IN ADULT: Status: ACTIVE | Noted: 2023-10-18

## 2024-04-18 PROBLEM — M77.30 CALCANEAL SPUR: Status: RESOLVED | Noted: 2024-04-18 | Resolved: 2024-04-18

## 2024-04-18 LAB — POC HEMOGLOBIN A1C: 5.6 % (ref 4.2–6.5)

## 2024-04-18 PROCEDURE — 99214 OFFICE O/P EST MOD 30 MIN: CPT | Performed by: FAMILY MEDICINE

## 2024-04-18 PROCEDURE — 1123F ACP DISCUSS/DSCN MKR DOCD: CPT | Performed by: FAMILY MEDICINE

## 2024-04-18 PROCEDURE — 1157F ADVNC CARE PLAN IN RCRD: CPT | Performed by: FAMILY MEDICINE

## 2024-04-18 PROCEDURE — 1160F RVW MEDS BY RX/DR IN RCRD: CPT | Performed by: FAMILY MEDICINE

## 2024-04-18 PROCEDURE — 1036F TOBACCO NON-USER: CPT | Performed by: FAMILY MEDICINE

## 2024-04-18 PROCEDURE — 3074F SYST BP LT 130 MM HG: CPT | Performed by: FAMILY MEDICINE

## 2024-04-18 PROCEDURE — 1158F ADVNC CARE PLAN TLK DOCD: CPT | Performed by: FAMILY MEDICINE

## 2024-04-18 PROCEDURE — 83036 HEMOGLOBIN GLYCOSYLATED A1C: CPT | Performed by: FAMILY MEDICINE

## 2024-04-18 PROCEDURE — 1170F FXNL STATUS ASSESSED: CPT | Performed by: FAMILY MEDICINE

## 2024-04-18 PROCEDURE — G0439 PPPS, SUBSEQ VISIT: HCPCS | Performed by: FAMILY MEDICINE

## 2024-04-18 PROCEDURE — 1159F MED LIST DOCD IN RCRD: CPT | Performed by: FAMILY MEDICINE

## 2024-04-18 PROCEDURE — 3078F DIAST BP <80 MM HG: CPT | Performed by: FAMILY MEDICINE

## 2024-04-18 RX ORDER — MIRABEGRON 25 MG/1
25 TABLET, FILM COATED, EXTENDED RELEASE ORAL DAILY
Qty: 30 TABLET | Refills: 1 | Status: SHIPPED | OUTPATIENT
Start: 2024-04-18 | End: 2024-04-23

## 2024-04-18 RX ORDER — VERAPAMIL HYDROCHLORIDE 120 MG/1
120 TABLET, FILM COATED ORAL 2 TIMES DAILY
Qty: 180 TABLET | Refills: 1 | Status: SHIPPED | OUTPATIENT
Start: 2024-04-18 | End: 2024-10-15

## 2024-04-18 RX ORDER — CALCIUM CITRATE/VITAMIN D3 200MG-6.25
TABLET ORAL
Qty: 100 STRIP | Refills: 3 | Status: SHIPPED | OUTPATIENT
Start: 2024-04-18

## 2024-04-18 RX ORDER — TRIAMCINOLONE ACETONIDE 1 MG/G
CREAM TOPICAL 2 TIMES DAILY
COMMUNITY
End: 2024-04-18 | Stop reason: SDUPTHER

## 2024-04-18 RX ORDER — ICOSAPENT ETHYL 1 G/1
2 CAPSULE ORAL
Qty: 360 CAPSULE | Refills: 1 | Status: SHIPPED | OUTPATIENT
Start: 2024-04-18 | End: 2024-10-15

## 2024-04-18 RX ORDER — LEVOTHYROXINE SODIUM 100 UG/1
100 TABLET ORAL DAILY
Qty: 90 TABLET | Refills: 1 | Status: SHIPPED | OUTPATIENT
Start: 2024-04-18 | End: 2024-10-15

## 2024-04-18 RX ORDER — LOVASTATIN 40 MG/1
80 TABLET ORAL DAILY
Qty: 180 TABLET | Refills: 1 | Status: SHIPPED | OUTPATIENT
Start: 2024-04-18 | End: 2024-10-15

## 2024-04-18 RX ORDER — OXYBUTYNIN CHLORIDE 10 MG/1
10 TABLET, EXTENDED RELEASE ORAL DAILY
Qty: 90 TABLET | Refills: 1 | Status: CANCELLED | OUTPATIENT
Start: 2024-04-18 | End: 2024-10-15

## 2024-04-18 RX ORDER — LISINOPRIL AND HYDROCHLOROTHIAZIDE 10; 12.5 MG/1; MG/1
1 TABLET ORAL DAILY
Qty: 90 TABLET | Refills: 1 | Status: SHIPPED | OUTPATIENT
Start: 2024-04-18 | End: 2024-10-15

## 2024-04-18 RX ORDER — TRIAMCINOLONE ACETONIDE 1 MG/G
CREAM TOPICAL 2 TIMES DAILY
Qty: 45 G | Refills: 1 | Status: SHIPPED | OUTPATIENT
Start: 2024-04-18

## 2024-04-18 ASSESSMENT — ENCOUNTER SYMPTOMS
DYSPHORIC MOOD: 0
DYSURIA: 0
COUGH: 0
DIARRHEA: 0
DIAPHORESIS: 0
ADENOPATHY: 0
UNEXPECTED WEIGHT CHANGE: 0
NUMBNESS: 0
WHEEZING: 0
DIZZINESS: 0
SINUS PAIN: 0
NAUSEA: 0
CONFUSION: 0
HEMATURIA: 0
ABDOMINAL PAIN: 0
SHORTNESS OF BREATH: 0
SINUS PRESSURE: 0
POLYPHAGIA: 0
FREQUENCY: 0
SORE THROAT: 0
LIGHT-HEADEDNESS: 0
NERVOUS/ANXIOUS: 0
HEADACHES: 0
PALPITATIONS: 0
CONSTIPATION: 0
FEVER: 0
CHEST TIGHTNESS: 0
VOMITING: 0
CHILLS: 0
POLYDIPSIA: 0

## 2024-04-18 ASSESSMENT — ACTIVITIES OF DAILY LIVING (ADL)
BATHING: INDEPENDENT
DOING_HOUSEWORK: INDEPENDENT
GROCERY_SHOPPING: INDEPENDENT
TAKING_MEDICATION: INDEPENDENT
DRESSING: INDEPENDENT
MANAGING_FINANCES: INDEPENDENT

## 2024-04-18 NOTE — ASSESSMENT & PLAN NOTE
- controlled. continue lisinopril/ hydrochlorothiazide and verapamil  - starting myrbetriq for urinary symptoms --> will have patient  monitor blood pressure at home and discuss in 1 month follow up

## 2024-04-18 NOTE — PATIENT INSTRUCTIONS
Nataliia Moralez ,    Thank you for coming in today. We at [facility] appreciate your trust in our care. If you have any questions or concerns about the care you received today, please do not hesitate to contact us at [phone].    The following instructions were discussed today:    - STOP oxybutynin  - START myrbetriq 25 mg daily   - monitor blood pressure at home about 2 times per week. We will discuss readings at your 1 month follow up.   - get bone density test (DEXA)  - Follow up in 1 month for virtual to discuss myrbetriq  - Follow up in 6 months in person for chronic issues   - Please get blood work done 1-2 weeks prior to your next in person visit. For blood work: Nothing to eat or drink for at least 10 hours prior. Okay for water or black coffee.

## 2024-04-18 NOTE — PROGRESS NOTES
"Subjective   Reason for Visit: Nataliia Moralez is an 78 y.o. female here for a Medicare Wellness visit.     Past Medical, Surgical, and Family History reviewed and updated in chart.    Reviewed all medications by prescribing practitioner or clinical pharmacist (such as prescriptions, OTCs, herbal therapies and supplements) and documented in the medical record.    HPI  routine follow up. chronic issues as per assessment and plan.     Office Visit on 04/18/2024   Component Date Value Ref Range Status    POC HEMOGLOBIN A1c 04/18/2024 5.6  4.2 - 6.5 % Final        Patient Care Team:  Sania Forrester MD as PCP - General  Sania Forrester MD as PCP - [employer] PCP     Review of Systems   Constitutional:  Negative for chills, diaphoresis, fever and unexpected weight change.   HENT:  Negative for congestion, sinus pressure, sinus pain, sneezing and sore throat.    Respiratory:  Negative for cough, chest tightness, shortness of breath and wheezing.    Cardiovascular:  Negative for chest pain, palpitations and leg swelling.   Gastrointestinal:  Negative for abdominal pain, constipation, diarrhea, nausea and vomiting.   Endocrine: Negative for cold intolerance, heat intolerance, polydipsia, polyphagia and polyuria.   Genitourinary:  Negative for dysuria, frequency, hematuria and urgency.   Neurological:  Negative for dizziness, syncope, light-headedness, numbness and headaches.   Hematological:  Negative for adenopathy.   Psychiatric/Behavioral:  Negative for confusion and dysphoric mood. The patient is not nervous/anxious.        Objective   Vitals:  /60 (BP Location: Right arm, Patient Position: Sitting, BP Cuff Size: Large adult)   Pulse 69   Resp 16   Ht 1.676 m (5' 6\")   Wt 98.9 kg (218 lb)   SpO2 98%   BMI 35.19 kg/m²       Physical Exam  Vitals and nursing note reviewed.   Constitutional:       General: She is not in acute distress.     Appearance: Normal appearance.   HENT:      Head: Normocephalic " and atraumatic.      Nose: Nose normal.   Eyes:      Extraocular Movements: Extraocular movements intact.      Conjunctiva/sclera: Conjunctivae normal.      Pupils: Pupils are equal, round, and reactive to light.   Cardiovascular:      Rate and Rhythm: Normal rate and regular rhythm.      Heart sounds: No murmur heard.     No friction rub. No gallop.   Pulmonary:      Effort: Pulmonary effort is normal.      Breath sounds: Normal breath sounds. No wheezing, rhonchi or rales.   Abdominal:      General: Bowel sounds are normal. There is no distension.      Palpations: Abdomen is soft.      Tenderness: There is no abdominal tenderness.   Musculoskeletal:         General: Normal range of motion.      Cervical back: Normal range of motion and neck supple.   Skin:     General: Skin is warm and dry.   Neurological:      General: No focal deficit present.      Mental Status: She is alert and oriented to person, place, and time.      Deep Tendon Reflexes: Reflexes normal.   Psychiatric:         Mood and Affect: Mood normal.         Behavior: Behavior normal.         Thought Content: Thought content normal.         Judgment: Judgment normal.         Assessment/Plan   Problem List Items Addressed This Visit       Acquired hypothyroidism    Current Assessment & Plan     - controlled. Continue levothyroxine  - recheck TFTs Oct 2024         Relevant Medications    levothyroxine (Synthroid, Levoxyl) 100 mcg tablet    Other Relevant Orders    Vitamin B12    CBC and Auto Differential    Comprehensive Metabolic Panel    TSH with reflex to Free T4 if abnormal    BMI 35.0-35.9,adult    Current Assessment & Plan     - Encouraged healthy lifestyle, including adequate exercise and high fiber, low fat and low carb diet.          Relevant Orders    Vitamin B12    CBC and Auto Differential    Comprehensive Metabolic Panel    Class 2 severe obesity due to excess calories with serious comorbidity and body mass index (BMI) of 35.0 to 35.9 in  adult (Multi)    Current Assessment & Plan     - Encouraged healthy lifestyle, including adequate exercise and high fiber, low fat and low carb diet.          Relevant Orders    Vitamin B12    CBC and Auto Differential    Comprehensive Metabolic Panel    Eczema    Current Assessment & Plan     - continue triamcinolone as needed          Relevant Medications    triamcinolone (Kenalog) 0.1 % cream    Essential hypertension    Current Assessment & Plan     - controlled. continue lisinopril/ hydrochlorothiazide and verapamil  - starting myrbetriq for urinary symptoms --> will have patient  monitor blood pressure at home and discuss in 1 month follow up         Relevant Medications    lisinopriL-hydrochlorothiazide 10-12.5 mg tablet    verapamil (Calan) 120 mg tablet    Other Relevant Orders    Vitamin B12    CBC and Auto Differential    Comprehensive Metabolic Panel    Hypertriglyceridemia    Current Assessment & Plan      - moderately well controlled with . continue lovastatin and vascepa   - recheck lipids Oct. 2024         Relevant Medications    icosapent ethyL (Vascepa) 1 gram capsule    lovastatin (Mevacor) 40 mg tablet    Other Relevant Orders    Vitamin B12    CBC and Auto Differential    Comprehensive Metabolic Panel    Mixed hyperlipidemia    Current Assessment & Plan      - controlled. continue lovastatin and vascepa   - recheck lipids Oct. 2024         Relevant Medications    lovastatin (Mevacor) 40 mg tablet    Other Relevant Orders    Vitamin B12    CBC and Auto Differential    Comprehensive Metabolic Panel    Lipid Panel    Overactive bladder    Current Assessment & Plan     - no improvement with increase to oxybutynin XL 10 mg daily   - still with urgency   - will trial myrbetriq instead         Relevant Medications    mirabegron (Mybetriq) 25 mg tablet extended release 24 hr 24 hr tablet    Other Relevant Orders    Vitamin B12    CBC and Auto Differential    Comprehensive Metabolic Panel    Stage  3a chronic kidney disease (Multi)    Current Assessment & Plan     - stable with Cr 1.02 and GFR 57  - monitor at least yearly (next due Oct. 2024)  - avoid NSAIDs         Relevant Orders    Vitamin B12    CBC and Auto Differential    Comprehensive Metabolic Panel    Lipid Panel    Type 2 diabetes mellitus with hyperglycemia, without long-term current use of insulin (Multi)    Current Assessment & Plan     - HbA1c 5.6 (6)  - diet controlled  - Encouraged healthy lifestyle, including adequate exercise and high fiber, low fat and low carb diet.   - recheck HbA1c in 6 months          Relevant Medications    blood sugar diagnostic (True Metrix Glucose Test Strip) strip    Other Relevant Orders    POCT glycosylated hemoglobin (Hb A1C) manually resulted (Completed)    Vitamin B12    Albumin , Urine Random    Hemoglobin A1C    CBC and Auto Differential    Comprehensive Metabolic Panel    Vitamin D deficiency    Current Assessment & Plan     - continue vitamin D         Relevant Orders    Vitamin D 25-Hydroxy,Total (for eval of Vitamin D levels)    Vitamin B12    CBC and Auto Differential    Comprehensive Metabolic Panel     Other Visit Diagnoses       Medicare annual wellness visit, subsequent    -  Primary    Need for hepatitis C screening test        Relevant Orders    Hepatitis C Antibody    Postmenopausal        Relevant Orders    XR DEXA bone density

## 2024-04-18 NOTE — ASSESSMENT & PLAN NOTE
- HbA1c 5.6 (6)  - diet controlled  - Encouraged healthy lifestyle, including adequate exercise and high fiber, low fat and low carb diet.   - recheck HbA1c in 6 months

## 2024-04-18 NOTE — ASSESSMENT & PLAN NOTE
- no improvement with increase to oxybutynin XL 10 mg daily   - still with urgency   - will trial myrbetriq instead

## 2024-04-22 ENCOUNTER — TELEPHONE (OUTPATIENT)
Dept: PRIMARY CARE | Facility: CLINIC | Age: 79
End: 2024-04-22
Payer: MEDICARE

## 2024-04-22 NOTE — TELEPHONE ENCOUNTER
Pt lm states you wanted her to switch from oxybutnin to myrbetriq but she would prefer to stay on oxybutnin maybe just increase the dose?

## 2024-04-23 ENCOUNTER — TELEPHONE (OUTPATIENT)
Dept: PRIMARY CARE | Facility: CLINIC | Age: 79
End: 2024-04-23

## 2024-04-23 DIAGNOSIS — N32.81 OVERACTIVE BLADDER: Primary | ICD-10-CM

## 2024-04-23 RX ORDER — OXYBUTYNIN CHLORIDE 15 MG/1
15 TABLET, EXTENDED RELEASE ORAL DAILY
Qty: 90 TABLET | Refills: 1 | Status: SHIPPED | OUTPATIENT
Start: 2024-04-23 | End: 2024-10-20

## 2024-05-14 ENCOUNTER — HOSPITAL ENCOUNTER (OUTPATIENT)
Dept: MAMMOGRAPHY | Age: 79
Discharge: HOME | End: 2024-05-16
Payer: MEDICARE

## 2024-05-14 DIAGNOSIS — Z78.0 POSTMENOPAUSAL: ICD-10-CM

## 2024-05-14 PROCEDURE — 77080 DXA BONE DENSITY AXIAL: CPT

## 2024-05-17 ENCOUNTER — APPOINTMENT (OUTPATIENT)
Dept: PRIMARY CARE | Facility: CLINIC | Age: 79
End: 2024-05-17
Payer: MEDICARE

## 2024-08-16 DIAGNOSIS — E55.9 VITAMIN D DEFICIENCY: ICD-10-CM

## 2024-08-18 RX ORDER — OMEGA-3S/DHA/EPA/FISH OIL/D3 300MG-1000
2000 CAPSULE ORAL DAILY
Qty: 90 TABLET | Refills: 10 | Status: SHIPPED | OUTPATIENT
Start: 2024-08-18

## 2024-09-27 DIAGNOSIS — E11.65 TYPE 2 DIABETES MELLITUS WITH HYPERGLYCEMIA, WITHOUT LONG-TERM CURRENT USE OF INSULIN: ICD-10-CM

## 2024-09-27 RX ORDER — LANCETS 33 GAUGE
EACH MISCELLANEOUS
Refills: 10 | OUTPATIENT
Start: 2024-09-27

## 2024-10-04 LAB
CHOLESTEROL (MG/DL) IN SER/PLAS EXTERNAL: 126 MG/DL
CHOLESTEROL IN HDL (MG/DL) IN SER/PLAS EXTERNAL: 33 MG/DL
CHOLESTEROL IN LDL (MG/DL) IN SERUM OR PLASMA BY CALCULATION EXTERNAL: 66 MG/DL
HEMOGLOBIN A1C/HEMOGLOBIN TOTAL IN BLOOD EXTERNAL: 5.8 %
THYROTROPIN (MIU/L) IN SER/PLAS BY DETECTION LIMIT <= 0.05 MIU/L EXTERNAL: 1.22 MIU/L
TRIGLYCERIDE (MG/DL) IN SER/PLAS EXTERNAL: 134 MG/DL

## 2024-10-07 ENCOUNTER — TELEPHONE (OUTPATIENT)
Dept: PRIMARY CARE | Facility: CLINIC | Age: 79
End: 2024-10-07
Payer: MEDICARE

## 2024-10-07 DIAGNOSIS — N32.81 OVERACTIVE BLADDER: ICD-10-CM

## 2024-10-07 DIAGNOSIS — K63.5 POLYP OF COLON, UNSPECIFIED PART OF COLON, UNSPECIFIED TYPE: ICD-10-CM

## 2024-10-07 DIAGNOSIS — E55.9 VITAMIN D DEFICIENCY: ICD-10-CM

## 2024-10-07 DIAGNOSIS — E78.1 HYPERTRIGLYCERIDEMIA: ICD-10-CM

## 2024-10-07 DIAGNOSIS — E03.9 ACQUIRED HYPOTHYROIDISM: ICD-10-CM

## 2024-10-07 DIAGNOSIS — Z12.11 COLON CANCER SCREENING: ICD-10-CM

## 2024-10-07 DIAGNOSIS — E53.8 VITAMIN B12 DEFICIENCY: Primary | ICD-10-CM

## 2024-10-07 DIAGNOSIS — E11.65 TYPE 2 DIABETES MELLITUS WITH HYPERGLYCEMIA, WITHOUT LONG-TERM CURRENT USE OF INSULIN: ICD-10-CM

## 2024-10-07 DIAGNOSIS — I10 ESSENTIAL HYPERTENSION: ICD-10-CM

## 2024-10-07 DIAGNOSIS — E78.2 MIXED HYPERLIPIDEMIA: ICD-10-CM

## 2024-10-07 RX ORDER — VERAPAMIL HYDROCHLORIDE 120 MG/1
120 TABLET, FILM COATED ORAL 2 TIMES DAILY
Qty: 180 TABLET | Refills: 1 | Status: SHIPPED | OUTPATIENT
Start: 2024-10-07 | End: 2025-04-05

## 2024-10-07 RX ORDER — LISINOPRIL AND HYDROCHLOROTHIAZIDE 10; 12.5 MG/1; MG/1
1 TABLET ORAL DAILY
Qty: 90 TABLET | Refills: 1 | Status: SHIPPED | OUTPATIENT
Start: 2024-10-07 | End: 2025-04-05

## 2024-10-07 RX ORDER — CALCIUM CITRATE/VITAMIN D3 200MG-6.25
TABLET ORAL
Qty: 100 STRIP | Refills: 3 | Status: SHIPPED | OUTPATIENT
Start: 2024-10-07

## 2024-10-07 RX ORDER — CHOLECALCIFEROL (VITAMIN D3) 50 MCG
2000 TABLET ORAL DAILY
Qty: 90 TABLET | Refills: 10 | Status: SHIPPED | OUTPATIENT
Start: 2024-10-07

## 2024-10-07 RX ORDER — LOVASTATIN 40 MG/1
80 TABLET ORAL DAILY
Qty: 180 TABLET | Refills: 1 | Status: SHIPPED | OUTPATIENT
Start: 2024-10-07 | End: 2025-04-05

## 2024-10-07 RX ORDER — LEVOTHYROXINE SODIUM 100 UG/1
100 TABLET ORAL DAILY
Qty: 90 TABLET | Refills: 1 | Status: SHIPPED | OUTPATIENT
Start: 2024-10-07 | End: 2025-04-05

## 2024-10-07 RX ORDER — LANCETS 33 GAUGE
EACH MISCELLANEOUS
Qty: 90 EACH | Refills: 3 | Status: SHIPPED | OUTPATIENT
Start: 2024-10-07

## 2024-10-07 RX ORDER — OXYBUTYNIN CHLORIDE 15 MG/1
15 TABLET, EXTENDED RELEASE ORAL DAILY
Qty: 90 TABLET | Refills: 1 | Status: SHIPPED | OUTPATIENT
Start: 2024-10-07 | End: 2025-04-05

## 2024-10-07 RX ORDER — ICOSAPENT ETHYL 1 G/1
2 CAPSULE ORAL
Qty: 360 CAPSULE | Refills: 1 | Status: SHIPPED | OUTPATIENT
Start: 2024-10-07 | End: 2025-04-05

## 2024-10-07 NOTE — TELEPHONE ENCOUNTER
Refill request: Patient is rescheduled to December but will need refill on her medication until then.   Vitamin D3 2,000 1 tablet daily   Oxybutynin XL 15mg 1 tablet daily   Vascepa 1gram 2 capsules twice a day   Levothyroxine 100mcg 1 tablet daily   Lisinopril-Hydrochlorothiazide 10-12.5mg 1 tablet daily   Lovastatin 40mg 2 tablets daily   Verapamin 120mg 1 tablet twice daily  Lancets and Strips-testing once a day    [facility]    **Dr. Forrester can you review blood work and advise on that?

## 2024-10-07 NOTE — TELEPHONE ENCOUNTER
1) scripts sent    2) I do not see full labs --> are they in scanning?    3) cholesterol and HbA1c look great!

## 2024-10-08 PROBLEM — E53.8 VITAMIN B12 DEFICIENCY: Status: ACTIVE | Noted: 2024-10-08

## 2024-10-08 RX ORDER — LANOLIN ALCOHOL/MO/W.PET/CERES
1000 CREAM (GRAM) TOPICAL DAILY
Qty: 90 TABLET | Refills: 3 | Status: SHIPPED | OUTPATIENT
Start: 2024-10-08 | End: 2025-10-08

## 2024-10-08 NOTE — TELEPHONE ENCOUNTER
Pt would like to get her colonoscopy done with Dr Mike Hinds in [place].  Phone # is  [phone] if we need to get fax number.  Told her we would fax referral for this.

## 2024-10-08 NOTE — TELEPHONE ENCOUNTER
Found the labs (they were in my inbox).     1) Please let patient know vitamin B12 is low. I recommend taking vitamin B12 1000 mcg daily. Recheck vitamin B12 level in 3 months. Order placed.     2) vitamin D level looks good. Continued vitamin D    3) HbA1c (diabetes number) is great at 5.8. Goal is less than 7.     4) cholesterol looks good. Continue lovastatin and vascepa    5) Remainder of lab work normal.

## 2024-10-08 NOTE — TELEPHONE ENCOUNTER
1) referral placed to Dr. Hinds    2) please also call patient with lab results (see further up in this message)

## 2024-10-08 NOTE — TELEPHONE ENCOUNTER
Pt notified of results, will mail lab order for b12 to patient, will fax referral this week to Dr. Hinds's office

## 2024-10-15 ENCOUNTER — APPOINTMENT (OUTPATIENT)
Dept: PRIMARY CARE | Facility: CLINIC | Age: 79
End: 2024-10-15
Payer: MEDICARE

## 2024-12-23 ENCOUNTER — APPOINTMENT (OUTPATIENT)
Dept: PRIMARY CARE | Facility: CLINIC | Age: 79
End: 2024-12-23
Payer: MEDICARE

## 2024-12-23 VITALS
OXYGEN SATURATION: 98 % | HEART RATE: 74 BPM | RESPIRATION RATE: 18 BRPM | DIASTOLIC BLOOD PRESSURE: 82 MMHG | HEIGHT: 66 IN | BODY MASS INDEX: 35.36 KG/M2 | SYSTOLIC BLOOD PRESSURE: 118 MMHG | WEIGHT: 220 LBS

## 2024-12-23 DIAGNOSIS — E78.2 MIXED HYPERLIPIDEMIA: ICD-10-CM

## 2024-12-23 DIAGNOSIS — N18.31 STAGE 3A CHRONIC KIDNEY DISEASE (MULTI): ICD-10-CM

## 2024-12-23 DIAGNOSIS — E03.9 ACQUIRED HYPOTHYROIDISM: ICD-10-CM

## 2024-12-23 DIAGNOSIS — N32.81 OVERACTIVE BLADDER: ICD-10-CM

## 2024-12-23 DIAGNOSIS — E66.01 CLASS 2 SEVERE OBESITY DUE TO EXCESS CALORIES WITH SERIOUS COMORBIDITY AND BODY MASS INDEX (BMI) OF 35.0 TO 35.9 IN ADULT: ICD-10-CM

## 2024-12-23 DIAGNOSIS — M25.561 CHRONIC PAIN OF RIGHT KNEE: ICD-10-CM

## 2024-12-23 DIAGNOSIS — Z23 ENCOUNTER FOR IMMUNIZATION: ICD-10-CM

## 2024-12-23 DIAGNOSIS — E66.812 CLASS 2 SEVERE OBESITY DUE TO EXCESS CALORIES WITH SERIOUS COMORBIDITY AND BODY MASS INDEX (BMI) OF 35.0 TO 35.9 IN ADULT: ICD-10-CM

## 2024-12-23 DIAGNOSIS — E11.65 TYPE 2 DIABETES MELLITUS WITH HYPERGLYCEMIA, WITHOUT LONG-TERM CURRENT USE OF INSULIN: Primary | ICD-10-CM

## 2024-12-23 DIAGNOSIS — I10 ESSENTIAL HYPERTENSION: ICD-10-CM

## 2024-12-23 DIAGNOSIS — G89.29 CHRONIC PAIN OF RIGHT KNEE: ICD-10-CM

## 2024-12-23 DIAGNOSIS — E53.8 VITAMIN B12 DEFICIENCY: ICD-10-CM

## 2024-12-23 DIAGNOSIS — E55.9 VITAMIN D DEFICIENCY: ICD-10-CM

## 2024-12-23 LAB — POC HEMOGLOBIN A1C: 5.8 % (ref 4.2–6.5)

## 2024-12-23 PROCEDURE — G2211 COMPLEX E/M VISIT ADD ON: HCPCS | Performed by: FAMILY MEDICINE

## 2024-12-23 PROCEDURE — 1123F ACP DISCUSS/DSCN MKR DOCD: CPT | Performed by: FAMILY MEDICINE

## 2024-12-23 PROCEDURE — 99214 OFFICE O/P EST MOD 30 MIN: CPT | Performed by: FAMILY MEDICINE

## 2024-12-23 PROCEDURE — 1157F ADVNC CARE PLAN IN RCRD: CPT | Performed by: FAMILY MEDICINE

## 2024-12-23 PROCEDURE — 1160F RVW MEDS BY RX/DR IN RCRD: CPT | Performed by: FAMILY MEDICINE

## 2024-12-23 PROCEDURE — 1036F TOBACCO NON-USER: CPT | Performed by: FAMILY MEDICINE

## 2024-12-23 PROCEDURE — 3074F SYST BP LT 130 MM HG: CPT | Performed by: FAMILY MEDICINE

## 2024-12-23 PROCEDURE — 83036 HEMOGLOBIN GLYCOSYLATED A1C: CPT | Performed by: FAMILY MEDICINE

## 2024-12-23 PROCEDURE — 1159F MED LIST DOCD IN RCRD: CPT | Performed by: FAMILY MEDICINE

## 2024-12-23 PROCEDURE — 3079F DIAST BP 80-89 MM HG: CPT | Performed by: FAMILY MEDICINE

## 2024-12-23 RX ORDER — DICLOFENAC SODIUM 10 MG/G
4 GEL TOPICAL 4 TIMES DAILY
Qty: 450 G | Refills: 5 | Status: SHIPPED | OUTPATIENT
Start: 2024-12-23 | End: 2025-06-21

## 2024-12-23 ASSESSMENT — PATIENT HEALTH QUESTIONNAIRE - PHQ9
1. LITTLE INTEREST OR PLEASURE IN DOING THINGS: NOT AT ALL
SUM OF ALL RESPONSES TO PHQ9 QUESTIONS 1 AND 2: 0
2. FEELING DOWN, DEPRESSED OR HOPELESS: NOT AT ALL

## 2024-12-23 ASSESSMENT — ENCOUNTER SYMPTOMS
COUGH: 0
CONFUSION: 0
WHEEZING: 0
ABDOMINAL PAIN: 0
CHEST TIGHTNESS: 0
DIARRHEA: 0
NERVOUS/ANXIOUS: 0
HEADACHES: 0
DYSPHORIC MOOD: 0
SINUS PRESSURE: 0
POLYDIPSIA: 0
UNEXPECTED WEIGHT CHANGE: 0
CONSTIPATION: 0
NAUSEA: 0
DYSURIA: 0
DIAPHORESIS: 0
PALPITATIONS: 0
SHORTNESS OF BREATH: 0
POLYPHAGIA: 0
FREQUENCY: 0
ADENOPATHY: 0
NUMBNESS: 0
VOMITING: 0
LIGHT-HEADEDNESS: 0
SORE THROAT: 0
SINUS PAIN: 0
DIZZINESS: 0
HEMATURIA: 0
FEVER: 0
CHILLS: 0

## 2024-12-23 NOTE — ASSESSMENT & PLAN NOTE
- HbA1c (5.8) (5.6) (6)  - diet controlled  - Encouraged healthy lifestyle, including adequate exercise and high fiber, low fat and low carb diet.   - recheck HbA1c in 6 months

## 2024-12-23 NOTE — ASSESSMENT & PLAN NOTE
- stable with Cr 0.99 (1.02) and GFR 59 (57)  - monitor at least yearly (next due Oct. 2025)  - avoid NSAIDs

## 2024-12-23 NOTE — ASSESSMENT & PLAN NOTE
- colonoscopy 10/22/21 with Dr. Hinds --> 3 tubular adenoma and 1 hyperplastic polyp  - will be having repeat April 2025

## 2024-12-23 NOTE — PROGRESS NOTES
"Subjective   Patient ID: Nataliia Moralez is a 79 y.o. female who presents for Follow-up (6 month follow up, right knee swelling has arthritis is wanting to discuss starting on diclofenac, had stopped b12 because she thought it was causing nausea but she was also taking ibuprofen at the time. Oxybutynin has not changed bladder issues ).    HPI   routine follow up. chronic issues as per assessment and plan.     She has been having swelling and pain in her right knee. Has seen orthopedics for this in the past and was told she had arthritis. She is using  ibuprofen as needed.     Review of Systems   Constitutional:  Negative for chills, diaphoresis, fever and unexpected weight change.   HENT:  Negative for congestion, sinus pressure, sinus pain, sneezing and sore throat.    Respiratory:  Negative for cough, chest tightness, shortness of breath and wheezing.    Cardiovascular:  Negative for chest pain, palpitations and leg swelling.   Gastrointestinal:  Negative for abdominal pain, constipation, diarrhea, nausea and vomiting.   Endocrine: Negative for cold intolerance, heat intolerance, polydipsia, polyphagia and polyuria.   Genitourinary:  Negative for dysuria, frequency, hematuria and urgency.   Neurological:  Negative for dizziness, syncope, light-headedness, numbness and headaches.   Hematological:  Negative for adenopathy.   Psychiatric/Behavioral:  Negative for confusion and dysphoric mood. The patient is not nervous/anxious.        Objective   /82 (BP Location: Right arm, Patient Position: Sitting, BP Cuff Size: Large adult)   Pulse 74   Resp 18   Ht 1.676 m (5' 6\")   Wt 99.8 kg (220 lb)   SpO2 98%   BMI 35.51 kg/m²     Physical Exam  Vitals and nursing note reviewed.   Constitutional:       General: She is not in acute distress.     Appearance: Normal appearance.   HENT:      Head: Normocephalic and atraumatic.      Nose: Nose normal.   Eyes:      Extraocular Movements: Extraocular movements intact.      " Conjunctiva/sclera: Conjunctivae normal.      Pupils: Pupils are equal, round, and reactive to light.   Cardiovascular:      Rate and Rhythm: Normal rate and regular rhythm.      Heart sounds: No murmur heard.     No friction rub. No gallop.   Pulmonary:      Effort: Pulmonary effort is normal.      Breath sounds: Normal breath sounds. No wheezing, rhonchi or rales.   Abdominal:      General: Bowel sounds are normal. There is no distension.      Palpations: Abdomen is soft.      Tenderness: There is no abdominal tenderness.   Musculoskeletal:         General: Normal range of motion.      Cervical back: Normal range of motion and neck supple.   Skin:     General: Skin is warm and dry.   Neurological:      General: No focal deficit present.      Mental Status: She is alert and oriented to person, place, and time.      Deep Tendon Reflexes: Reflexes normal.   Psychiatric:         Mood and Affect: Mood normal.         Behavior: Behavior normal.         Thought Content: Thought content normal.         Judgment: Judgment normal.         Assessment/Plan   Problem List Items Addressed This Visit             ICD-10-CM    Acquired hypothyroidism E03.9     - controlled. Continue levothyroxine  - recheck TFTs Oct 2025         BMI 35.0-35.9,adult Z68.35     - Encouraged healthy lifestyle, including adequate exercise and high fiber, low fat and low carb diet.          Chronic pain of right knee M25.561, G89.29     - most likely secondary to osteoarthritis  - start diclofenac gtl  - check x-ray          Relevant Medications    diclofenac sodium (Voltaren) 1 % gel    Other Relevant Orders    XR knee right 4+ views    Class 2 severe obesity due to excess calories with serious comorbidity and body mass index (BMI) of 35.0 to 35.9 in adult E66.812, E66.01, Z68.35     - Encouraged healthy lifestyle, including adequate exercise and high fiber, low fat and low carb diet.          Encounter for immunization Z23     - recommended the  following vaccines at the pharmacy: Tdap, Shingrix, RSV, COVID-19         Essential hypertension I10     - controlled. continue lisinopril/ hydrochlorothiazide and verapamil           Mixed hyperlipidemia E78.2      - controlled. continue lovastatin and vascepa   - recheck lipids Oct. 2025         Overactive bladder N32.81     - no improvement with oxybutynin  - discussed myrbetriq but decided to hold off secondary to her history of hypertension   - refer to Dr. Pack          Relevant Orders    Referral to Urogynecology    Stage 3a chronic kidney disease (Multi) N18.31     - stable with Cr 0.99 (1.02) and GFR 59 (57)  - monitor at least yearly (next due Oct. 2025)  - avoid NSAIDs         Type 2 diabetes mellitus with hyperglycemia, without long-term current use of insulin - Primary E11.65     - HbA1c (5.8) (5.6) (6)  - diet controlled  - Encouraged healthy lifestyle, including adequate exercise and high fiber, low fat and low carb diet.   - recheck HbA1c in 6 months          Relevant Orders    POCT glycosylated hemoglobin (Hb A1C) manually resulted (Completed)    Vitamin B12    Hemoglobin A1C    Vitamin B12 deficiency E53.8     - B12 supplement was causing her nausea so she stopped that  - check B12 in 6 months          Relevant Orders    Vitamin B12    Vitamin D deficiency E55.9     - continue vitamin D         Relevant Orders    Vitamin D 25-Hydroxy,Total (for eval of Vitamin D levels)        Patient was identified as a fall risk. Risk prevention instructions provided.

## 2024-12-23 NOTE — PATIENT INSTRUCTIONS
Nataliia Moralez ,    Thank you for coming in today. We at [facility] appreciate your trust in our care. If you have any questions or concerns about the care you received today, please do not hesitate to contact us at [phone].    The following instructions were discussed today:   - refer to urogynecology   - Follow up 4/22/2025 as scheduled.    - Please get blood work done 1-2 weeks prior to your next visit.   - I recommend the following vaccines at the pharmacy: Tdap, Shingrix, RSV, COVID-19      Ways to Help Prevent Falls at Home    Quick Tips   ? Ask for help if you need it. Most people want to help!   ? Get up slowly after sitting or laying down   ? Wear a medical alert device or keep cell phone in your pocket   ? Use night lights, especially areas near a bathroom   ? Keep the items you use often within reach on a small stool or end table   ? Use an assistive device such as walker or cane, as directed by provider/physical therapy   ? Use a non-slip mat and grab bars in your bathroom. Look for home health sections for best options     Other Areas to Focus On   ? Exercise and nutrition: Regular exercise or taking a falls prevention class are great ways improve strength and balance. Don’t forget to stay hydrated and bring a snack!   ? Medicine side effects: Some medicines can make you sleepy or dizzy, which could cause a fall. Ask your healthcare provider about the side effects your medicines could cause. Be sure to let them know if you take any vitamins or supplements as well.   ? Tripping hazards: Remove items you could trip on, such as loose mats, rugs, cords, and clutter. Wear closed toe shoes with rubber soles.   ? Health and wellness: Get regular checkups with your healthcare provider, plus routine vision and hearing screenings. Talk with your healthcare provider about:   o Your medicines and the possible side effects - bring them in a bag if that is easier!   o Problems with balance or  feeling dizzy   o Ways to promote bone health, such as Vitamin D and calcium supplements   o Questions or concerns about falling     *Ask your healthcare team if you have questions     ©[facility], 2022

## 2024-12-23 NOTE — ASSESSMENT & PLAN NOTE
- no improvement with oxybutynin  - discussed myrbetriq but decided to hold off secondary to her history of hypertension   - refer to Dr. Pack

## 2025-03-11 ENCOUNTER — HOSPITAL ENCOUNTER (OUTPATIENT)
Dept: GENERAL RADIOLOGY | Age: 80
Discharge: HOME | End: 2025-03-13
Payer: MEDICARE

## 2025-03-11 ENCOUNTER — HOSPITAL ENCOUNTER (OUTPATIENT)
Age: 80
Discharge: HOME | End: 2025-03-13
Payer: MEDICARE

## 2025-03-11 DIAGNOSIS — M25.561 PAIN, JOINT, KNEE, RIGHT: ICD-10-CM

## 2025-03-11 PROCEDURE — 73560 X-RAY EXAM OF KNEE 1 OR 2: CPT

## 2025-03-12 ENCOUNTER — TELEPHONE (OUTPATIENT)
Dept: PRIMARY CARE | Facility: CLINIC | Age: 80
End: 2025-03-12
Payer: MEDICARE

## 2025-03-12 NOTE — TELEPHONE ENCOUNTER
My guess is I thought she was bringing in reports and not the actual discs. Does she have reports?

## 2025-03-12 NOTE — TELEPHONE ENCOUNTER
Patient dropped off a couple discs with radiology images for review. She said she spoke to you about this and you advised she bring them in. I didn't think you were able to view these though? I did stick them on your desk

## 2025-03-13 NOTE — TELEPHONE ENCOUNTER
Can you get reports for these? I tried pulling them across because it looks like they were done at [facility], but nothing pulled across. She can have the discs back because I cannot view them anyway. I put the discs on your desk    Imaging down was right knee x-ray from 3/11/25 and right knee x-ray from 1/7/16

## 2025-03-27 ENCOUNTER — APPOINTMENT (OUTPATIENT)
Dept: UROLOGY | Facility: CLINIC | Age: 80
End: 2025-03-27
Payer: MEDICARE

## 2025-04-16 LAB — HEMOGLOBIN A1C/HEMOGLOBIN TOTAL IN BLOOD EXTERNAL: 6 %

## 2025-04-22 ENCOUNTER — APPOINTMENT (OUTPATIENT)
Dept: PRIMARY CARE | Facility: CLINIC | Age: 80
End: 2025-04-22
Payer: MEDICARE

## 2025-04-22 VITALS
DIASTOLIC BLOOD PRESSURE: 63 MMHG | HEART RATE: 72 BPM | HEIGHT: 66 IN | WEIGHT: 223 LBS | RESPIRATION RATE: 16 BRPM | OXYGEN SATURATION: 97 % | BODY MASS INDEX: 35.84 KG/M2 | SYSTOLIC BLOOD PRESSURE: 96 MMHG

## 2025-04-22 DIAGNOSIS — Z13.820 OSTEOPOROSIS SCREENING: ICD-10-CM

## 2025-04-22 DIAGNOSIS — E66.01 CLASS 2 SEVERE OBESITY DUE TO EXCESS CALORIES WITH SERIOUS COMORBIDITY AND BODY MASS INDEX (BMI) OF 35.0 TO 35.9 IN ADULT: ICD-10-CM

## 2025-04-22 DIAGNOSIS — Z23 ENCOUNTER FOR IMMUNIZATION: ICD-10-CM

## 2025-04-22 DIAGNOSIS — N18.31 STAGE 3A CHRONIC KIDNEY DISEASE (MULTI): ICD-10-CM

## 2025-04-22 DIAGNOSIS — Z01.00 DIABETIC EYE EXAM (MULTI): ICD-10-CM

## 2025-04-22 DIAGNOSIS — E03.9 ACQUIRED HYPOTHYROIDISM: ICD-10-CM

## 2025-04-22 DIAGNOSIS — M25.561 CHRONIC PAIN OF RIGHT KNEE: ICD-10-CM

## 2025-04-22 DIAGNOSIS — N32.81 OVERACTIVE BLADDER: ICD-10-CM

## 2025-04-22 DIAGNOSIS — I10 ESSENTIAL HYPERTENSION: ICD-10-CM

## 2025-04-22 DIAGNOSIS — E11.9 DIABETIC EYE EXAM (MULTI): ICD-10-CM

## 2025-04-22 DIAGNOSIS — Z12.11 COLON CANCER SCREENING: ICD-10-CM

## 2025-04-22 DIAGNOSIS — E66.812 CLASS 2 SEVERE OBESITY DUE TO EXCESS CALORIES WITH SERIOUS COMORBIDITY AND BODY MASS INDEX (BMI) OF 35.0 TO 35.9 IN ADULT: ICD-10-CM

## 2025-04-22 DIAGNOSIS — Z00.00 MEDICARE ANNUAL WELLNESS VISIT, SUBSEQUENT: Primary | ICD-10-CM

## 2025-04-22 DIAGNOSIS — E78.2 MIXED HYPERLIPIDEMIA: ICD-10-CM

## 2025-04-22 DIAGNOSIS — E11.65 TYPE 2 DIABETES MELLITUS WITH HYPERGLYCEMIA, WITHOUT LONG-TERM CURRENT USE OF INSULIN: ICD-10-CM

## 2025-04-22 DIAGNOSIS — E78.1 HYPERTRIGLYCERIDEMIA: ICD-10-CM

## 2025-04-22 DIAGNOSIS — E55.9 VITAMIN D DEFICIENCY: ICD-10-CM

## 2025-04-22 DIAGNOSIS — E53.8 VITAMIN B12 DEFICIENCY: ICD-10-CM

## 2025-04-22 DIAGNOSIS — G89.29 CHRONIC PAIN OF RIGHT KNEE: ICD-10-CM

## 2025-04-22 PROCEDURE — G0439 PPPS, SUBSEQ VISIT: HCPCS | Performed by: FAMILY MEDICINE

## 2025-04-22 PROCEDURE — 3074F SYST BP LT 130 MM HG: CPT | Performed by: FAMILY MEDICINE

## 2025-04-22 PROCEDURE — 99214 OFFICE O/P EST MOD 30 MIN: CPT | Performed by: FAMILY MEDICINE

## 2025-04-22 PROCEDURE — 1159F MED LIST DOCD IN RCRD: CPT | Performed by: FAMILY MEDICINE

## 2025-04-22 PROCEDURE — 1036F TOBACCO NON-USER: CPT | Performed by: FAMILY MEDICINE

## 2025-04-22 PROCEDURE — 3078F DIAST BP <80 MM HG: CPT | Performed by: FAMILY MEDICINE

## 2025-04-22 PROCEDURE — 1157F ADVNC CARE PLAN IN RCRD: CPT | Performed by: FAMILY MEDICINE

## 2025-04-22 PROCEDURE — 1160F RVW MEDS BY RX/DR IN RCRD: CPT | Performed by: FAMILY MEDICINE

## 2025-04-22 PROCEDURE — 1170F FXNL STATUS ASSESSED: CPT | Performed by: FAMILY MEDICINE

## 2025-04-22 PROCEDURE — 1123F ACP DISCUSS/DSCN MKR DOCD: CPT | Performed by: FAMILY MEDICINE

## 2025-04-22 RX ORDER — LOVASTATIN 40 MG/1
80 TABLET ORAL DAILY
Qty: 180 TABLET | Refills: 1 | Status: SHIPPED | OUTPATIENT
Start: 2025-04-22 | End: 2025-10-19

## 2025-04-22 RX ORDER — CHOLECALCIFEROL (VITAMIN D3) 50 MCG
50 TABLET ORAL DAILY
Qty: 90 TABLET | Refills: 10 | Status: SHIPPED | OUTPATIENT
Start: 2025-04-22

## 2025-04-22 RX ORDER — VERAPAMIL HYDROCHLORIDE 120 MG/1
120 TABLET, FILM COATED ORAL 2 TIMES DAILY
Qty: 180 TABLET | Refills: 1 | Status: SHIPPED | OUTPATIENT
Start: 2025-04-22 | End: 2025-10-19

## 2025-04-22 RX ORDER — LEVOTHYROXINE SODIUM 100 UG/1
100 TABLET ORAL DAILY
Qty: 90 TABLET | Refills: 1 | Status: SHIPPED | OUTPATIENT
Start: 2025-04-22 | End: 2025-10-19

## 2025-04-22 RX ORDER — OXYBUTYNIN CHLORIDE 15 MG/1
TABLET, EXTENDED RELEASE ORAL
COMMUNITY
Start: 2025-02-05 | End: 2025-04-22 | Stop reason: SDUPTHER

## 2025-04-22 RX ORDER — LISINOPRIL AND HYDROCHLOROTHIAZIDE 10; 12.5 MG/1; MG/1
1 TABLET ORAL DAILY
Qty: 90 TABLET | Refills: 1 | Status: SHIPPED | OUTPATIENT
Start: 2025-04-22 | End: 2025-10-19

## 2025-04-22 RX ORDER — ICOSAPENT ETHYL 1 G/1
2 CAPSULE ORAL
Qty: 360 CAPSULE | Refills: 1 | Status: SHIPPED | OUTPATIENT
Start: 2025-04-22 | End: 2025-10-19

## 2025-04-22 RX ORDER — OXYBUTYNIN CHLORIDE 15 MG/1
15 TABLET, EXTENDED RELEASE ORAL DAILY
Qty: 90 TABLET | Refills: 1 | Status: SHIPPED | OUTPATIENT
Start: 2025-04-22

## 2025-04-22 ASSESSMENT — ENCOUNTER SYMPTOMS
FREQUENCY: 0
DYSPHORIC MOOD: 0
LIGHT-HEADEDNESS: 0
COUGH: 0
FEVER: 0
UNEXPECTED WEIGHT CHANGE: 0
VOMITING: 0
CHEST TIGHTNESS: 0
SINUS PRESSURE: 0
DIZZINESS: 0
NUMBNESS: 0
CONSTIPATION: 0
ADENOPATHY: 0
HEMATURIA: 0
SORE THROAT: 0
CONFUSION: 0
POLYPHAGIA: 0
PALPITATIONS: 0
WHEEZING: 0
ABDOMINAL PAIN: 0
DIAPHORESIS: 0
DIARRHEA: 0
POLYDIPSIA: 0
NAUSEA: 0
DYSURIA: 0
HEADACHES: 0
NERVOUS/ANXIOUS: 0
SINUS PAIN: 0
SHORTNESS OF BREATH: 0
CHILLS: 0

## 2025-04-22 ASSESSMENT — ACTIVITIES OF DAILY LIVING (ADL)
DRESSING: INDEPENDENT
TAKING_MEDICATION: INDEPENDENT
GROCERY_SHOPPING: INDEPENDENT
BATHING: INDEPENDENT
DOING_HOUSEWORK: INDEPENDENT
MANAGING_FINANCES: INDEPENDENT

## 2025-04-22 NOTE — ASSESSMENT & PLAN NOTE
- declines PCV 20   - recommended the following vaccines at the pharmacy: Tdap, Shingrix, RSV, COVID-19

## 2025-04-22 NOTE — ASSESSMENT & PLAN NOTE
- controlled.   - current regimen:  lisinopril/ hydrochlorothiazide 10/12.5 daily and verapamil 120 mg daily

## 2025-04-22 NOTE — ASSESSMENT & PLAN NOTE
- secondary to arthritis  - continue diclofenac gel  - offered joint injection. She states she will think about this  - offered orthopedic referral. She states she will think about this

## 2025-04-22 NOTE — PATIENT INSTRUCTIONS
Nataliia Moralez ,    Thank you for coming in today. We at [facility] appreciate your trust in our care. If you have any questions or concerns about the care you received today, please do not hesitate to contact us at [phone].    The following instructions were discussed today:    - Follow up in 6 months   - Please get blood work done 1-2 weeks prior to your next visit. For blood work: Nothing to eat or drink for at least 10 hours prior. Okay for water or black coffee.   - I recommend the following vaccines at the pharmacy: Tdap, Shingrix, RSV, COVID-19

## 2025-04-22 NOTE — ASSESSMENT & PLAN NOTE
- HbA1c 6 (5.8) (5.6) (6)  - diet controlled  - Encouraged healthy lifestyle, including adequate exercise and high fiber, low fat and low carb diet.   - recheck HbA1c in 6 months

## 2025-04-22 NOTE — PROGRESS NOTES
"Subjective   Reason for Visit: Nataliia Moralez is an 79 y.o. female here for a Medicare Wellness visit.     Past Medical, Surgical, and Family History reviewed and updated in chart.    Reviewed all medications by prescribing practitioner or clinical pharmacist (such as prescriptions, OTCs, herbal therapies and supplements) and documented in the medical record.    HPI  Here for medicare wellness. Also here for routine follow up. chronic issues as per assessment and plan.     Reviewed labs from 4/14/25. HbA1c 6. Vitamin B12 678. Vitamin D 34.7    Patient Care Team:  Sania Forrester MD as PCP - General  Sania Forrester MD as PCP - [employer] PCP     Review of Systems   Constitutional:  Negative for chills, diaphoresis, fever and unexpected weight change.   HENT:  Negative for congestion, sinus pressure, sinus pain, sneezing and sore throat.    Respiratory:  Negative for cough, chest tightness, shortness of breath and wheezing.    Cardiovascular:  Negative for chest pain, palpitations and leg swelling.   Gastrointestinal:  Negative for abdominal pain, constipation, diarrhea, nausea and vomiting.   Endocrine: Negative for cold intolerance, heat intolerance, polydipsia, polyphagia and polyuria.   Genitourinary:  Negative for dysuria, frequency, hematuria and urgency.   Neurological:  Negative for dizziness, syncope, light-headedness, numbness and headaches.   Hematological:  Negative for adenopathy.   Psychiatric/Behavioral:  Negative for confusion and dysphoric mood. The patient is not nervous/anxious.        Objective   Vitals:  BP 96/63 (BP Location: Right arm, Patient Position: Sitting, BP Cuff Size: Large adult long)   Pulse 72   Resp 16   Ht 1.676 m (5' 6\")   Wt 101 kg (223 lb)   SpO2 97%   BMI 35.99 kg/m²       Physical Exam  Vitals and nursing note reviewed.   Constitutional:       General: She is not in acute distress.     Appearance: Normal appearance.   HENT:      Head: Normocephalic and " atraumatic.      Nose: Nose normal.   Eyes:      Extraocular Movements: Extraocular movements intact.      Conjunctiva/sclera: Conjunctivae normal.      Pupils: Pupils are equal, round, and reactive to light.   Cardiovascular:      Rate and Rhythm: Normal rate and regular rhythm.      Heart sounds: No murmur heard.     No friction rub. No gallop.   Pulmonary:      Effort: Pulmonary effort is normal.      Breath sounds: Normal breath sounds. No wheezing, rhonchi or rales.   Abdominal:      General: Bowel sounds are normal. There is no distension.      Palpations: Abdomen is soft.      Tenderness: There is no abdominal tenderness.   Musculoskeletal:         General: Normal range of motion.      Cervical back: Normal range of motion and neck supple.   Skin:     General: Skin is warm and dry.   Neurological:      General: No focal deficit present.      Mental Status: She is alert and oriented to person, place, and time.      Deep Tendon Reflexes: Reflexes normal.   Psychiatric:         Mood and Affect: Mood normal.         Behavior: Behavior normal.         Thought Content: Thought content normal.         Judgment: Judgment normal.         Assessment & Plan  Medicare annual wellness visit, subsequent         Type 2 diabetes mellitus with hyperglycemia, without long-term current use of insulin  - HbA1c 6 (5.8) (5.6) (6)  - diet controlled  - Encouraged healthy lifestyle, including adequate exercise and high fiber, low fat and low carb diet.   - recheck HbA1c in 6 months          Stage 3a chronic kidney disease (Multi)  - stable with Cr 0.99 (1.02) and GFR 59 (57)  - monitor at least yearly (next due Oct. 2025)  - avoid NSAIDs         Class 2 severe obesity due to excess calories with serious comorbidity and body mass index (BMI) of 35.0 to 35.9 in adult  - Encouraged healthy lifestyle, including adequate exercise and high fiber, low fat and low carb diet.          BMI 35.0-35.9,adult  - Encouraged healthy lifestyle,  including adequate exercise and high fiber, low fat and low carb diet.          Acquired hypothyroidism  - controlled. Continue levothyroxine  - recheck TFTs Oct 2025         Essential hypertension  - controlled.   - current regimen:  lisinopril/ hydrochlorothiazide 10/12.5 daily and verapamil 120 mg daily           Hypertriglyceridemia   - controlled with .   - continue lovastatin and vascepa   - recheck lipids Oct. 2025         Mixed hyperlipidemia   - controlled. continue lovastatin and vascepa   - recheck lipids Oct. 2025         Vitamin D deficiency  - continue vitamin D         Chronic pain of right knee  - secondary to arthritis  - continue diclofenac gel  - offered joint injection. She states she will think about this  - offered orthopedic referral. She states she will think about this         Vitamin B12 deficiency  - B12 good at 678         Overactive bladder  - continue ditropan XL         Diabetic eye exam (Multi)  - goes yearly          Colon cancer screening  - colonoscopy 4/4/25 with recommended 3 year repeat          Encounter for immunization  - declines PCV 20   - recommended the following vaccines at the pharmacy: Tdap, Shingrix, RSV, COVID-19         Osteoporosis screening  - normal DEXA 5/2024; repeat 5/2026

## 2025-07-28 ENCOUNTER — OFFICE VISIT (OUTPATIENT)
Dept: ORTHOPEDIC SURGERY | Age: 80
End: 2025-07-28
Payer: MEDICARE

## 2025-07-28 VITALS — HEIGHT: 66 IN | WEIGHT: 225 LBS | BODY MASS INDEX: 36.16 KG/M2

## 2025-07-28 DIAGNOSIS — M17.11 PRIMARY OSTEOARTHRITIS OF RIGHT KNEE: Primary | ICD-10-CM

## 2025-07-28 PROCEDURE — 1090F PRES/ABSN URINE INCON ASSESS: CPT | Performed by: ORTHOPAEDIC SURGERY

## 2025-07-28 PROCEDURE — 1159F MED LIST DOCD IN RCRD: CPT | Performed by: ORTHOPAEDIC SURGERY

## 2025-07-28 PROCEDURE — G8417 CALC BMI ABV UP PARAM F/U: HCPCS | Performed by: ORTHOPAEDIC SURGERY

## 2025-07-28 PROCEDURE — 1160F RVW MEDS BY RX/DR IN RCRD: CPT | Performed by: ORTHOPAEDIC SURGERY

## 2025-07-28 PROCEDURE — G8427 DOCREV CUR MEDS BY ELIG CLIN: HCPCS | Performed by: ORTHOPAEDIC SURGERY

## 2025-07-28 PROCEDURE — 1123F ACP DISCUSS/DSCN MKR DOCD: CPT | Performed by: ORTHOPAEDIC SURGERY

## 2025-07-28 PROCEDURE — 99203 OFFICE O/P NEW LOW 30 MIN: CPT | Performed by: ORTHOPAEDIC SURGERY

## 2025-07-28 PROCEDURE — 4004F PT TOBACCO SCREEN RCVD TLK: CPT | Performed by: ORTHOPAEDIC SURGERY

## 2025-07-28 PROCEDURE — 1125F AMNT PAIN NOTED PAIN PRSNT: CPT | Performed by: ORTHOPAEDIC SURGERY

## 2025-07-28 PROCEDURE — G8399 PT W/DXA RESULTS DOCUMENT: HCPCS | Performed by: ORTHOPAEDIC SURGERY

## 2025-07-28 NOTE — PROGRESS NOTES
Georgie Rg is a 79 y.o. female, who presents   Chief Complaint   Patient presents with    Knee Pain     Patient presents today for right knee pain. Patient reports her right knee is starting to bother her, sharp like pain at times. Not a constant pain. No recent injury to the right knee, no past surgery to right knee. Elevates knee on occasion. Tylenol and Lidocaine patch for pain with relief.        HPI:: Mrs. Quiroz has had some right knee pain.  This is intermittent in nature.  Sometimes it may have to do with her activity level.  She has difficulty getting up from a squatting type position.  She had recalled some discomfort in the knee about 9 years ago when she had seen us.  She has had x-rays ordered by her primary care doctor.  She says that she has been told in the past not to take anti-inflammatories that she has some renal disease and she also got some GI upset from Advil.    Allergies; medications; past medical, surgical, family, and social history; and problem list have been reviewed today and updated as indicated in this encounter - see below following Ortho specifics.    Musculoskeletal: Skin condition and gross neurovascular functions good in right lower extremity.  Hip range of motion is good with no instability or pain.  Right knee range of motion is 0 to 120 degrees with some crepitus throughout the range.  Collateral and cruciate ligaments are stable.  There is only very mild effusion with no hyperemia or erythema.  There is very mild tenderness to palpation medial joint line.  There are no specific signs of meniscal pathology with stress examination and Tamara testing.    Radiologic Studies: Imaging of the knee 3/11/2025 nonweightbearing shows very slight medial joint space narrowing and hypertrophic marginal changes medial compartment.    ASSESSMENT:  Georgie was seen today for knee pain.    Diagnoses and all orders for this visit:    Primary osteoarthritis of right knee     Treatment

## 2025-08-22 ENCOUNTER — APPOINTMENT (OUTPATIENT)
Dept: GENERAL RADIOLOGY | Age: 80
End: 2025-08-22
Payer: MEDICARE

## 2025-08-22 ENCOUNTER — APPOINTMENT (OUTPATIENT)
Dept: ULTRASOUND IMAGING | Age: 80
End: 2025-08-22
Payer: MEDICARE

## 2025-08-22 ENCOUNTER — HOSPITAL ENCOUNTER (EMERGENCY)
Age: 80
Discharge: HOME | End: 2025-08-22
Attending: EMERGENCY MEDICINE
Payer: MEDICARE

## 2025-08-22 VITALS
OXYGEN SATURATION: 95 % | HEART RATE: 102 BPM | SYSTOLIC BLOOD PRESSURE: 135 MMHG | TEMPERATURE: 98 F | DIASTOLIC BLOOD PRESSURE: 82 MMHG | RESPIRATION RATE: 18 BRPM

## 2025-08-22 DIAGNOSIS — G89.29 CHRONIC PAIN OF RIGHT KNEE: Primary | ICD-10-CM

## 2025-08-22 DIAGNOSIS — M79.604 RIGHT LEG PAIN: ICD-10-CM

## 2025-08-22 DIAGNOSIS — M25.561 CHRONIC PAIN OF RIGHT KNEE: Primary | ICD-10-CM

## 2025-08-22 PROCEDURE — 6360000002 HC RX W HCPCS: Performed by: NURSE PRACTITIONER

## 2025-08-22 PROCEDURE — 6370000000 HC RX 637 (ALT 250 FOR IP): Performed by: NURSE PRACTITIONER

## 2025-08-22 PROCEDURE — 93971 EXTREMITY STUDY: CPT

## 2025-08-22 PROCEDURE — 73562 X-RAY EXAM OF KNEE 3: CPT

## 2025-08-22 PROCEDURE — 99284 EMERGENCY DEPT VISIT MOD MDM: CPT

## 2025-08-22 PROCEDURE — 96372 THER/PROPH/DIAG INJ SC/IM: CPT

## 2025-08-22 RX ORDER — KETOROLAC TROMETHAMINE 30 MG/ML
30 INJECTION, SOLUTION INTRAMUSCULAR; INTRAVENOUS ONCE
Status: COMPLETED | OUTPATIENT
Start: 2025-08-22 | End: 2025-08-22

## 2025-08-22 RX ORDER — METHYLPREDNISOLONE 4 MG/1
TABLET ORAL
Qty: 1 KIT | Refills: 0 | Status: SHIPPED | OUTPATIENT
Start: 2025-08-22 | End: 2025-08-28

## 2025-08-22 RX ADMIN — PREDNISONE 50 MG: 20 TABLET ORAL at 12:34

## 2025-08-22 RX ADMIN — KETOROLAC TROMETHAMINE 30 MG: 30 INJECTION, SOLUTION INTRAMUSCULAR at 12:35

## 2025-08-22 ASSESSMENT — LIFESTYLE VARIABLES: HOW MANY STANDARD DRINKS CONTAINING ALCOHOL DO YOU HAVE ON A TYPICAL DAY: PATIENT DOES NOT DRINK

## 2025-08-25 ENCOUNTER — TELEPHONE (OUTPATIENT)
Dept: PRIMARY CARE | Facility: CLINIC | Age: 80
End: 2025-08-25
Payer: MEDICARE

## 2025-08-27 ENCOUNTER — OFFICE VISIT (OUTPATIENT)
Dept: ORTHOPEDIC SURGERY | Age: 80
End: 2025-08-27
Payer: MEDICARE

## 2025-08-27 DIAGNOSIS — M79.604 LEG PAIN, RIGHT: ICD-10-CM

## 2025-08-27 DIAGNOSIS — M17.11 PRIMARY OSTEOARTHRITIS OF RIGHT KNEE: Primary | ICD-10-CM

## 2025-08-27 PROCEDURE — G8399 PT W/DXA RESULTS DOCUMENT: HCPCS | Performed by: ORTHOPAEDIC SURGERY

## 2025-08-27 PROCEDURE — G8417 CALC BMI ABV UP PARAM F/U: HCPCS | Performed by: ORTHOPAEDIC SURGERY

## 2025-08-27 PROCEDURE — 1159F MED LIST DOCD IN RCRD: CPT | Performed by: ORTHOPAEDIC SURGERY

## 2025-08-27 PROCEDURE — 99213 OFFICE O/P EST LOW 20 MIN: CPT | Performed by: ORTHOPAEDIC SURGERY

## 2025-08-27 PROCEDURE — 1090F PRES/ABSN URINE INCON ASSESS: CPT | Performed by: ORTHOPAEDIC SURGERY

## 2025-08-27 PROCEDURE — 4004F PT TOBACCO SCREEN RCVD TLK: CPT | Performed by: ORTHOPAEDIC SURGERY

## 2025-08-27 PROCEDURE — 1123F ACP DISCUSS/DSCN MKR DOCD: CPT | Performed by: ORTHOPAEDIC SURGERY

## 2025-08-27 PROCEDURE — G8427 DOCREV CUR MEDS BY ELIG CLIN: HCPCS | Performed by: ORTHOPAEDIC SURGERY

## 2025-08-27 PROCEDURE — 1160F RVW MEDS BY RX/DR IN RCRD: CPT | Performed by: ORTHOPAEDIC SURGERY

## 2025-08-27 PROCEDURE — 1125F AMNT PAIN NOTED PAIN PRSNT: CPT | Performed by: ORTHOPAEDIC SURGERY

## 2025-10-21 ENCOUNTER — APPOINTMENT (OUTPATIENT)
Dept: PRIMARY CARE | Facility: CLINIC | Age: 80
End: 2025-10-21
Payer: MEDICARE

## 2026-04-22 ENCOUNTER — APPOINTMENT (OUTPATIENT)
Dept: PRIMARY CARE | Facility: CLINIC | Age: 81
End: 2026-04-22
Payer: MEDICARE